# Patient Record
Sex: FEMALE | Race: WHITE | Employment: FULL TIME | URBAN - METROPOLITAN AREA
[De-identification: names, ages, dates, MRNs, and addresses within clinical notes are randomized per-mention and may not be internally consistent; named-entity substitution may affect disease eponyms.]

---

## 2017-01-23 RX ORDER — SUMATRIPTAN 100 MG/1
TABLET, FILM COATED ORAL
Qty: 9 TABLET | Refills: 3 | OUTPATIENT
Start: 2017-01-23

## 2017-01-24 ENCOUNTER — OFFICE VISIT (OUTPATIENT)
Dept: FAMILY MEDICINE CLINIC | Age: 52
End: 2017-01-24

## 2017-01-24 VITALS
HEART RATE: 72 BPM | DIASTOLIC BLOOD PRESSURE: 82 MMHG | TEMPERATURE: 97.4 F | RESPIRATION RATE: 12 BRPM | BODY MASS INDEX: 32.79 KG/M2 | WEIGHT: 178.2 LBS | HEIGHT: 62 IN | SYSTOLIC BLOOD PRESSURE: 134 MMHG

## 2017-01-24 DIAGNOSIS — G43.809 OTHER TYPE OF MIGRAINE: ICD-10-CM

## 2017-01-24 DIAGNOSIS — F41.9 ANXIETY: Primary | ICD-10-CM

## 2017-01-24 PROCEDURE — G8419 CALC BMI OUT NRM PARAM NOF/U: HCPCS | Performed by: FAMILY MEDICINE

## 2017-01-24 PROCEDURE — G8427 DOCREV CUR MEDS BY ELIG CLIN: HCPCS | Performed by: FAMILY MEDICINE

## 2017-01-24 PROCEDURE — 99213 OFFICE O/P EST LOW 20 MIN: CPT | Performed by: FAMILY MEDICINE

## 2017-01-24 PROCEDURE — G8484 FLU IMMUNIZE NO ADMIN: HCPCS | Performed by: FAMILY MEDICINE

## 2017-01-24 PROCEDURE — 1036F TOBACCO NON-USER: CPT | Performed by: FAMILY MEDICINE

## 2017-01-24 RX ORDER — SUMATRIPTAN 100 MG/1
TABLET, FILM COATED ORAL
Qty: 9 TABLET | Refills: 5 | Status: SHIPPED | OUTPATIENT
Start: 2017-01-24 | End: 2017-12-27 | Stop reason: SDUPTHER

## 2017-01-24 RX ORDER — ALPRAZOLAM 0.5 MG/1
TABLET ORAL
Qty: 30 TABLET | Refills: 2 | Status: SHIPPED | OUTPATIENT
Start: 2017-01-24 | End: 2017-12-27 | Stop reason: SDUPTHER

## 2017-02-14 RX ORDER — DESOGESTREL AND ETHINYL ESTRADIOL 0.15-0.03
KIT ORAL
Qty: 84 TABLET | Refills: 5 | Status: SHIPPED | OUTPATIENT
Start: 2017-02-14 | End: 2018-10-22 | Stop reason: ALTCHOICE

## 2017-08-28 ENCOUNTER — TELEPHONE (OUTPATIENT)
Dept: FAMILY MEDICINE CLINIC | Age: 52
End: 2017-08-28

## 2017-12-27 ENCOUNTER — OFFICE VISIT (OUTPATIENT)
Dept: FAMILY MEDICINE CLINIC | Age: 52
End: 2017-12-27

## 2017-12-27 VITALS
HEIGHT: 62 IN | TEMPERATURE: 98.9 F | BODY MASS INDEX: 34.6 KG/M2 | RESPIRATION RATE: 16 BRPM | WEIGHT: 188 LBS | DIASTOLIC BLOOD PRESSURE: 72 MMHG | HEART RATE: 97 BPM | SYSTOLIC BLOOD PRESSURE: 118 MMHG

## 2017-12-27 DIAGNOSIS — M25.562 LEFT KNEE PAIN, UNSPECIFIED CHRONICITY: Primary | ICD-10-CM

## 2017-12-27 DIAGNOSIS — R53.81 MALAISE AND FATIGUE: ICD-10-CM

## 2017-12-27 DIAGNOSIS — F41.9 ANXIETY: ICD-10-CM

## 2017-12-27 DIAGNOSIS — Z12.11 COLON CANCER SCREENING: ICD-10-CM

## 2017-12-27 DIAGNOSIS — Z12.31 VISIT FOR SCREENING MAMMOGRAM: ICD-10-CM

## 2017-12-27 DIAGNOSIS — R68.83 CHILLS: ICD-10-CM

## 2017-12-27 DIAGNOSIS — R53.83 MALAISE AND FATIGUE: ICD-10-CM

## 2017-12-27 LAB
INFLUENZA A ANTIBODY: NEGATIVE
INFLUENZA B ANTIBODY: NEGATIVE

## 2017-12-27 PROCEDURE — G8419 CALC BMI OUT NRM PARAM NOF/U: HCPCS | Performed by: FAMILY MEDICINE

## 2017-12-27 PROCEDURE — 3017F COLORECTAL CA SCREEN DOC REV: CPT | Performed by: FAMILY MEDICINE

## 2017-12-27 PROCEDURE — 1036F TOBACCO NON-USER: CPT | Performed by: FAMILY MEDICINE

## 2017-12-27 PROCEDURE — 87804 INFLUENZA ASSAY W/OPTIC: CPT | Performed by: FAMILY MEDICINE

## 2017-12-27 PROCEDURE — G8484 FLU IMMUNIZE NO ADMIN: HCPCS | Performed by: FAMILY MEDICINE

## 2017-12-27 PROCEDURE — G8427 DOCREV CUR MEDS BY ELIG CLIN: HCPCS | Performed by: FAMILY MEDICINE

## 2017-12-27 PROCEDURE — 20610 DRAIN/INJ JOINT/BURSA W/O US: CPT | Performed by: FAMILY MEDICINE

## 2017-12-27 PROCEDURE — 99213 OFFICE O/P EST LOW 20 MIN: CPT | Performed by: FAMILY MEDICINE

## 2017-12-27 RX ORDER — METHYLPREDNISOLONE ACETATE 80 MG/ML
80 INJECTION, SUSPENSION INTRA-ARTICULAR; INTRALESIONAL; INTRAMUSCULAR; SOFT TISSUE ONCE
Status: COMPLETED | OUTPATIENT
Start: 2017-12-27 | End: 2017-12-27

## 2017-12-27 RX ORDER — AZITHROMYCIN 250 MG/1
TABLET, FILM COATED ORAL
Qty: 1 PACKET | Refills: 0 | Status: SHIPPED | OUTPATIENT
Start: 2017-12-27 | End: 2018-01-06

## 2017-12-27 RX ORDER — SUMATRIPTAN 100 MG/1
TABLET, FILM COATED ORAL
Qty: 9 TABLET | Refills: 5 | Status: SHIPPED | OUTPATIENT
Start: 2017-12-27 | End: 2018-10-22 | Stop reason: SDUPTHER

## 2017-12-27 RX ORDER — ALPRAZOLAM 0.5 MG/1
TABLET ORAL
Qty: 30 TABLET | Refills: 2 | Status: SHIPPED | OUTPATIENT
Start: 2017-12-27 | End: 2018-07-08 | Stop reason: SDUPTHER

## 2017-12-27 RX ADMIN — METHYLPREDNISOLONE ACETATE 80 MG: 80 INJECTION, SUSPENSION INTRA-ARTICULAR; INTRALESIONAL; INTRAMUSCULAR; SOFT TISSUE at 14:17

## 2017-12-27 ASSESSMENT — ENCOUNTER SYMPTOMS
ABDOMINAL PAIN: 0
DIARRHEA: 0
EYE DISCHARGE: 0
CONSTIPATION: 0
COUGH: 0
SINUS PRESSURE: 0
SORE THROAT: 0
EYE ITCHING: 0
SHORTNESS OF BREATH: 0

## 2017-12-27 NOTE — PROGRESS NOTES
Allergies  Current Outpatient Prescriptions   Medication Sig Dispense Refill    APRI 0.15-30 MG-MCG per tablet TAKE 1 TABLET DAILY 84 tablet 5    ALPRAZolam (XANAX) 0.5 MG tablet TAKE 1 TABLET NIGHTLY AS NEEDED 30 tablet 2    SUMAtriptan (IMITREX) 100 MG tablet TAKE 1 TABLET BY MOUTH ONCE AS NEEDED 9 tablet 5    oxyCODONE-acetaminophen (PERCOCET) 5-325 MG per tablet Take 1 tablet by mouth every 6 hours as needed. Dr. Berenice Johnson Pain management specialist       Current Facility-Administered Medications   Medication Dose Route Frequency Provider Last Rate Last Dose    sodium hyaluronate (viscosup) injection 20 mg  20 mg Intra-articular Once Geena Crowe MD        sodium hyaluronate (viscosup) injection 20 mg  20 mg Intra-articular Once Geena Crowe MD        methylPREDNISolone acetate (DEPO-MEDROL) injection 80 mg  80 mg Intramuscular Once Geena Crowe MD         PMH, Surgical Hx, Family Hx, and Social Hx reviewed and updated. Health Maintenance reviewed. Objective    Vitals:    12/27/17 1340   Pulse: 97   Weight: 188 lb (85.3 kg)       Physical Exam   Constitutional: She is oriented to person, place, and time. She appears well-developed and well-nourished. HENT:   Head: Normocephalic. Mouth/Throat: Oropharynx is clear and moist.   Eyes: Pupils are equal, round, and reactive to light. Neck: Normal range of motion. Neck supple. No thyromegaly present. Cardiovascular: Normal rate and intact distal pulses. Exam reveals no gallop and no friction rub. No murmur heard. Pulmonary/Chest: Effort normal and breath sounds normal.   Abdominal: Soft. Bowel sounds are normal.   Musculoskeletal: Normal range of motion. Neurological: She is alert and oriented to person, place, and time. Skin: Skin is warm and dry. Psychiatric: She has a normal mood and affect. Her behavior is normal.   Patient has Max a sinus discomfort reveals postnasal drainage neck supple lungs clear left TM all. Nontender. Left knee injected as described also start her on some metabolic for the sinus infection flu test is negative and she'll let us know if she is not improving. Assessment & Plan   1. Left knee pain, unspecified chronicity  [address] - [place] DRAIN/INJECT LARGE JOINT/BURSA    methylPREDNISolone acetate (DEPO-MEDROL) injection 80 mg   2. Malaise and fatigue  POCT Influenza A/B   3. Chills  POCT Influenza A/B   4. Visit for screening mammogram  RAVI DIGITAL SCREEN W CAD BILATERAL   5. Colon cancer screening  Ambulatory referral to Gastroenterology   6. Anxiety  ALPRAZolam (XANAX) 0.5 MG tablet     Orders Placed This Encounter   Procedures    RAVI DIGITAL SCREEN W CAD BILATERAL     Standing Status:   Future     Standing Expiration Date:   12/27/2018     Order Specific Question:   Reason for exam:     Answer:   screening    Ambulatory referral to Gastroenterology     Referral Priority:   Routine     Referral Type:   Consult for Advice and Opinion     Referral Reason:   Specialty Services Required     Requested Specialty:   Gastroenterology     Number of Visits Requested:   1    POCT Influenza A/B    20610 - [place] DRAIN/INJECT LARGE JOINT/BURSA     Orders Placed This Encounter   Medications    methylPREDNISolone acetate (DEPO-MEDROL) injection 80 mg    ALPRAZolam (XANAX) 0.5 MG tablet     Sig: TAKE 1 TABLET NIGHTLY AS NEEDED. Dispense:  30 tablet     Refill:  2    azithromycin (ZITHROMAX Z-GALO) 250 MG tablet     Sig: Take 2 pills together on the first day. Take 1 pill a day for the remaining 4 days. Dispense:  1 packet     Refill:  0    SUMAtriptan (IMITREX) 100 MG tablet     Sig: TAKE 1 TABLET BY MOUTH ONCE AS NEEDED     Dispense:  9 tablet     Refill:  5     Medications Discontinued During This Encounter   Medication Reason    ALPRAZolam (XANAX) 0.5 MG tablet Reorder    SUMAtriptan (IMITREX) 100 MG tablet Reorder     No Follow-up on file.         Controlled Substances Monitoring:          Felipe Root MD            Knee Injection Procedure Note - Depo Medrol    Pre-operative Diagnosis: left Osteoarthritis    Post-operative Diagnosis: same    Indications: Osteoarthritic knee pain not responding to other therapy    Anesthesia: Lidocaine 1% without epinephrine     Procedure Details  Verbal consent was obtained for the procedure. The joint was prepped with Betadine and a small wheel of anesthetic was injected into the subcutaneous tissue. A 22 gauge needle was inserted into the anterior aspect of the joint inserting Depo Medrol. The needle was removed and the area cleansed and dressed. Complications:  None; patient tolerated the procedure well. Post - procedure instructions: If patient develops redness, swelling, pain, tenderness, fever, chills, bleeding, bruising, or drainage she will let us know immediately. Activity as tolerated. She may shower and bathe at home. Encounter Diagnoses   Name Primary?  Left knee pain, unspecified chronicity Yes    Malaise and fatigue     Chills     Visit for screening mammogram     Colon cancer screening          Medical Decision Making:  Orders Placed This Encounter   Procedures    POCT Influenza A/B      No orders of the defined types were placed in this encounter.           oarrs report ran 12/27/17

## 2018-01-02 ENCOUNTER — TELEPHONE (OUTPATIENT)
Dept: FAMILY MEDICINE CLINIC | Age: 53
End: 2018-01-02

## 2018-01-03 RX ORDER — CEFDINIR 300 MG/1
300 CAPSULE ORAL 2 TIMES DAILY
Qty: 20 CAPSULE | Refills: 0 | Status: SHIPPED | OUTPATIENT
Start: 2018-01-03 | End: 2018-10-22 | Stop reason: ALTCHOICE

## 2018-01-17 ENCOUNTER — HOSPITAL ENCOUNTER (OUTPATIENT)
Dept: WOMENS IMAGING | Age: 53
Discharge: HOME | End: 2018-01-17
Payer: COMMERCIAL

## 2018-01-17 DIAGNOSIS — Z12.31 VISIT FOR SCREENING MAMMOGRAM: ICD-10-CM

## 2018-01-17 PROCEDURE — 77067 SCR MAMMO BI INCL CAD: CPT

## 2018-02-05 ENCOUNTER — TELEPHONE (OUTPATIENT)
Dept: ENDOSCOPY | Age: 53
End: 2018-02-05

## 2018-03-16 ENCOUNTER — ANESTHESIA EVENT (OUTPATIENT)
Dept: ENDOSCOPY | Age: 53
End: 2018-03-16
Payer: COMMERCIAL

## 2018-03-16 ENCOUNTER — HOSPITAL ENCOUNTER (OUTPATIENT)
Age: 53
Setting detail: OUTPATIENT SURGERY
Discharge: HOME | End: 2018-03-16
Attending: SPECIALIST | Admitting: SPECIALIST
Payer: COMMERCIAL

## 2018-03-16 ENCOUNTER — ANESTHESIA (OUTPATIENT)
Dept: ENDOSCOPY | Age: 53
End: 2018-03-16
Payer: COMMERCIAL

## 2018-03-16 VITALS
WEIGHT: 175 LBS | DIASTOLIC BLOOD PRESSURE: 62 MMHG | OXYGEN SATURATION: 98 % | HEART RATE: 62 BPM | BODY MASS INDEX: 31.01 KG/M2 | TEMPERATURE: 98.1 F | HEIGHT: 63 IN | RESPIRATION RATE: 16 BRPM | SYSTOLIC BLOOD PRESSURE: 101 MMHG

## 2018-03-16 VITALS
SYSTOLIC BLOOD PRESSURE: 108 MMHG | DIASTOLIC BLOOD PRESSURE: 62 MMHG | RESPIRATION RATE: 15 BRPM | OXYGEN SATURATION: 98 %

## 2018-03-16 PROCEDURE — 7100000011 HC PHASE II RECOVERY - ADDTL 15 MIN: Performed by: SPECIALIST

## 2018-03-16 PROCEDURE — 88305 TISSUE EXAM BY PATHOLOGIST: CPT

## 2018-03-16 PROCEDURE — 3609027000 HC COLONOSCOPY: Performed by: SPECIALIST

## 2018-03-16 PROCEDURE — 3700000000 HC ANESTHESIA ATTENDED CARE: Performed by: SPECIALIST

## 2018-03-16 PROCEDURE — 3700000001 HC ADD 15 MINUTES (ANESTHESIA): Performed by: SPECIALIST

## 2018-03-16 PROCEDURE — 7100000010 HC PHASE II RECOVERY - FIRST 15 MIN: Performed by: SPECIALIST

## 2018-03-16 PROCEDURE — 6360000002 HC RX W HCPCS: Performed by: NURSE ANESTHETIST, CERTIFIED REGISTERED

## 2018-03-16 RX ORDER — LIDOCAINE HYDROCHLORIDE 10 MG/ML
1 INJECTION, SOLUTION EPIDURAL; INFILTRATION; INTRACAUDAL; PERINEURAL
Status: DISCONTINUED | OUTPATIENT
Start: 2018-03-16 | End: 2018-03-16 | Stop reason: HOSPADM

## 2018-03-16 RX ORDER — SODIUM CHLORIDE 0.9 % (FLUSH) 0.9 %
10 SYRINGE (ML) INJECTION EVERY 12 HOURS SCHEDULED
Status: DISCONTINUED | OUTPATIENT
Start: 2018-03-16 | End: 2018-03-16 | Stop reason: HOSPADM

## 2018-03-16 RX ORDER — SODIUM CHLORIDE 9 MG/ML
INJECTION, SOLUTION INTRAVENOUS CONTINUOUS
Status: DISCONTINUED | OUTPATIENT
Start: 2018-03-16 | End: 2018-03-16 | Stop reason: HOSPADM

## 2018-03-16 RX ORDER — SODIUM CHLORIDE 0.9 % (FLUSH) 0.9 %
10 SYRINGE (ML) INJECTION PRN
Status: DISCONTINUED | OUTPATIENT
Start: 2018-03-16 | End: 2018-03-16 | Stop reason: HOSPADM

## 2018-03-16 RX ORDER — ONDANSETRON 2 MG/ML
4 INJECTION INTRAMUSCULAR; INTRAVENOUS
Status: DISCONTINUED | OUTPATIENT
Start: 2018-03-16 | End: 2018-03-16 | Stop reason: HOSPADM

## 2018-03-16 RX ORDER — PROPOFOL 10 MG/ML
INJECTION, EMULSION INTRAVENOUS CONTINUOUS PRN
Status: DISCONTINUED | OUTPATIENT
Start: 2018-03-16 | End: 2018-03-16 | Stop reason: SDUPTHER

## 2018-03-16 RX ADMIN — PROPOFOL 100 MCG/KG/MIN: 10 INJECTION, EMULSION INTRAVENOUS at 07:21

## 2018-03-16 NOTE — ANESTHESIA PRE PROCEDURE
Department of Anesthesiology  Preprocedure Note       Name:  Sam Simon   Age:  50 y.o.  :  1965                                          MRN:  [id number]         Date:  3/16/2018      Surgeon: Arya Floyd):  Breezy Kahn MD    Procedure: Procedure(s):  COLONOSCOPY    Medications prior to admission:   Prior to Admission medications    Medication Sig Start Date End Date Taking? Authorizing Provider   cefdinir (OMNICEF) 300 MG capsule Take 1 capsule by mouth 2 times daily 1/3/18   Dashawn Pérez MD   ALPRAZolam Rhoderick Kubas) 0.5 MG tablet TAKE 1 TABLET NIGHTLY AS NEEDED. 17   Dashawn Pérez MD   SUMAtriptan (IMITREX) 100 MG tablet TAKE 1 TABLET BY MOUTH ONCE AS NEEDED 17   Dashawn Pérez MD   APRI 0.15-30 MG-MCG per tablet TAKE 1 TABLET DAILY 17   Dashawn Pérez MD   oxyCODONE-acetaminophen (PERCOCET) 5-325 MG per tablet Take 1 tablet by mouth every 6 hours as needed. Dr. Stanley Rangel Pain management specialist 2/15/13   Historical Provider, MD       Current medications:    No current facility-administered medications for this encounter. Allergies:  No Known Allergies    Problem List:    Patient Active Problem List   Diagnosis Code    Anxiety F41.9    Depression F32.9    Anemia D64.9    Metrorrhagia N92.1    Hot flashes R23.2       Past Medical History:        Diagnosis Date    Anemia     Anxiety     Depression     Hot flashes 2014    Metrorrhagia        Past Surgical History:  History reviewed. No pertinent surgical history. Social History:    Social History   Substance Use Topics    Smoking status: Former Smoker     Packs/day: 0.50     Years: 10.00     Types: Cigarettes     Start date: 1990     Quit date: 2000    Smokeless tobacco: Never Used    Alcohol use Yes                                Counseling given: Not Answered      Vital Signs (Current): There were no vitals filed for this visit.                                            BP Readings from Last 3 Encounters:   12/27/17 118/72   01/24/17 134/82   12/11/15 114/72       NPO Status:                                                                                 BMI:   Wt Readings from Last 3 Encounters:   12/27/17 188 lb (85.3 kg)   01/24/17 178 lb 3.2 oz (80.8 kg)   12/11/15 177 lb (80.3 kg)     There is no height or weight on file to calculate BMI.    CBC:   Lab Results   Component Value Date    WBC 4.4 03/12/2015    RBC 4.17 03/12/2015    HGB 13.1 03/12/2015    HCT 40.1 03/12/2015    MCV 96.0 03/12/2015    RDW 14.7 03/12/2015     03/12/2015       CMP:   Lab Results   Component Value Date     09/08/2014    K 4.5 09/08/2014     09/08/2014    CO2 23 09/08/2014    BUN 11 09/08/2014    CREATININE 0.64 09/08/2014    GFRAA >60.0 09/08/2014    LABGLOM >60.0 09/08/2014    GLUCOSE 98 09/08/2014    PROT 6.5 09/08/2014    CALCIUM 9.3 09/08/2014    BILITOT 0.1 09/08/2014    ALKPHOS 36 09/08/2014    AST 19 09/08/2014    ALT 14 09/08/2014       POC Tests: No results for input(s): POCGLU, POCNA, POCK, POCCL, POCBUN, POCHEMO, POCHCT in the last 72 hours. Coags:   Lab Results   Component Value Date    PROTIME 10.0 08/09/2012    INR 1.0 08/09/2012       HCG (If Applicable): No results found for: PREGTESTUR, PREGSERUM, HCG, HCGQUANT     ABGs: No results found for: PHART, PO2ART, TTJ3JLV, LWD7FDB, BEART, D5RVGINM     Type & Screen (If Applicable):  No results found for: LABABO, [address]    Anesthesia Evaluation  Patient summary reviewed and Nursing notes reviewed  Airway: Mallampati: II  TM distance: <3 FB   Neck ROM: full  Mouth opening: < 3 FB Dental:          Pulmonary:                              Cardiovascular:                      Neuro/Psych:   (+) psychiatric history:            GI/Hepatic/Renal:             Endo/Other:                     Abdominal:           Vascular:                                        Anesthesia Plan      MAC     ASA 2       Induction: intravenous.       Anesthetic plan

## 2018-05-16 ENCOUNTER — OFFICE VISIT (OUTPATIENT)
Dept: FAMILY MEDICINE CLINIC | Age: 53
End: 2018-05-16
Payer: COMMERCIAL

## 2018-05-16 VITALS
HEART RATE: 75 BPM | WEIGHT: 191 LBS | DIASTOLIC BLOOD PRESSURE: 78 MMHG | HEIGHT: 63 IN | BODY MASS INDEX: 33.84 KG/M2 | SYSTOLIC BLOOD PRESSURE: 118 MMHG | TEMPERATURE: 97.8 F | RESPIRATION RATE: 20 BRPM

## 2018-05-16 DIAGNOSIS — M25.562 LEFT KNEE PAIN, UNSPECIFIED CHRONICITY: Primary | ICD-10-CM

## 2018-05-16 PROCEDURE — 20610 DRAIN/INJ JOINT/BURSA W/O US: CPT | Performed by: FAMILY MEDICINE

## 2018-05-16 RX ORDER — METHYLPREDNISOLONE ACETATE 80 MG/ML
80 INJECTION, SUSPENSION INTRA-ARTICULAR; INTRALESIONAL; INTRAMUSCULAR; SOFT TISSUE ONCE
Status: COMPLETED | OUTPATIENT
Start: 2018-05-16 | End: 2018-05-16

## 2018-05-16 RX ADMIN — METHYLPREDNISOLONE ACETATE 80 MG: 80 INJECTION, SUSPENSION INTRA-ARTICULAR; INTRALESIONAL; INTRAMUSCULAR; SOFT TISSUE at 11:18

## 2018-05-16 ASSESSMENT — ENCOUNTER SYMPTOMS
DIARRHEA: 0
SHORTNESS OF BREATH: 0
SORE THROAT: 0
COUGH: 0
ABDOMINAL PAIN: 0
EYE DISCHARGE: 0
CONSTIPATION: 0
EYE ITCHING: 0
SINUS PRESSURE: 0

## 2018-05-16 ASSESSMENT — PATIENT HEALTH QUESTIONNAIRE - PHQ9
SUM OF ALL RESPONSES TO PHQ9 QUESTIONS 1 & 2: 0
SUM OF ALL RESPONSES TO PHQ QUESTIONS 1-9: 0
2. FEELING DOWN, DEPRESSED OR HOPELESS: 0
1. LITTLE INTEREST OR PLEASURE IN DOING THINGS: 0

## 2018-05-17 ENCOUNTER — TELEPHONE (OUTPATIENT)
Dept: FAMILY MEDICINE CLINIC | Age: 53
End: 2018-05-17

## 2018-06-15 ENCOUNTER — TELEPHONE (OUTPATIENT)
Dept: FAMILY MEDICINE CLINIC | Age: 53
End: 2018-06-15

## 2018-07-08 DIAGNOSIS — F41.9 ANXIETY: ICD-10-CM

## 2018-07-09 RX ORDER — ALPRAZOLAM 0.5 MG/1
TABLET ORAL
Qty: 30 TABLET | Refills: 0 | OUTPATIENT
Start: 2018-07-09 | End: 2018-08-08

## 2018-10-22 ENCOUNTER — OFFICE VISIT (OUTPATIENT)
Dept: FAMILY MEDICINE CLINIC | Age: 53
End: 2018-10-22
Payer: COMMERCIAL

## 2018-10-22 VITALS
TEMPERATURE: 97.1 F | SYSTOLIC BLOOD PRESSURE: 132 MMHG | WEIGHT: 188 LBS | BODY MASS INDEX: 33.31 KG/M2 | HEART RATE: 78 BPM | DIASTOLIC BLOOD PRESSURE: 82 MMHG | RESPIRATION RATE: 20 BRPM | HEIGHT: 63 IN

## 2018-10-22 DIAGNOSIS — J01.90 ACUTE BACTERIAL SINUSITIS: ICD-10-CM

## 2018-10-22 DIAGNOSIS — B96.89 ACUTE BACTERIAL SINUSITIS: ICD-10-CM

## 2018-10-22 DIAGNOSIS — F41.9 ANXIETY: ICD-10-CM

## 2018-10-22 DIAGNOSIS — Z78.0 POSTMENOPAUSE: ICD-10-CM

## 2018-10-22 DIAGNOSIS — J34.89 SINUS PRESSURE: Primary | ICD-10-CM

## 2018-10-22 PROCEDURE — 3017F COLORECTAL CA SCREEN DOC REV: CPT | Performed by: FAMILY MEDICINE

## 2018-10-22 PROCEDURE — G8484 FLU IMMUNIZE NO ADMIN: HCPCS | Performed by: FAMILY MEDICINE

## 2018-10-22 PROCEDURE — G8427 DOCREV CUR MEDS BY ELIG CLIN: HCPCS | Performed by: FAMILY MEDICINE

## 2018-10-22 PROCEDURE — 1036F TOBACCO NON-USER: CPT | Performed by: FAMILY MEDICINE

## 2018-10-22 PROCEDURE — 99213 OFFICE O/P EST LOW 20 MIN: CPT | Performed by: FAMILY MEDICINE

## 2018-10-22 PROCEDURE — G8417 CALC BMI ABV UP PARAM F/U: HCPCS | Performed by: FAMILY MEDICINE

## 2018-10-22 RX ORDER — CEFDINIR 300 MG/1
300 CAPSULE ORAL 2 TIMES DAILY
Qty: 20 CAPSULE | Refills: 0 | Status: SHIPPED | OUTPATIENT
Start: 2018-10-22 | End: 2019-02-18 | Stop reason: ALTCHOICE

## 2018-10-22 RX ORDER — ALPRAZOLAM 0.5 MG/1
TABLET ORAL
Qty: 30 TABLET | Refills: 2 | Status: SHIPPED | OUTPATIENT
Start: 2018-10-22 | End: 2018-11-22

## 2018-10-22 RX ORDER — SUMATRIPTAN 100 MG/1
TABLET, FILM COATED ORAL
Qty: 9 TABLET | Refills: 5 | Status: SHIPPED | OUTPATIENT
Start: 2018-10-22 | End: 2019-02-18 | Stop reason: SDUPTHER

## 2018-10-22 RX ORDER — METHYLPREDNISOLONE 4 MG/1
TABLET ORAL
Qty: 1 KIT | Refills: 0 | Status: SHIPPED | OUTPATIENT
Start: 2018-10-22 | End: 2019-02-18 | Stop reason: ALTCHOICE

## 2018-10-22 ASSESSMENT — ENCOUNTER SYMPTOMS
SHORTNESS OF BREATH: 0
DIARRHEA: 0
COUGH: 1
SORE THROAT: 0
CONSTIPATION: 0
EYE DISCHARGE: 0
SINUS PRESSURE: 1
EYE ITCHING: 0
ABDOMINAL PAIN: 0

## 2019-02-18 ENCOUNTER — OFFICE VISIT (OUTPATIENT)
Dept: FAMILY MEDICINE CLINIC | Age: 54
End: 2019-02-18
Payer: COMMERCIAL

## 2019-02-18 VITALS
DIASTOLIC BLOOD PRESSURE: 72 MMHG | TEMPERATURE: 98 F | SYSTOLIC BLOOD PRESSURE: 118 MMHG | RESPIRATION RATE: 20 BRPM | WEIGHT: 185 LBS | HEIGHT: 63 IN | BODY MASS INDEX: 32.78 KG/M2

## 2019-02-18 DIAGNOSIS — R82.90 ABNORMAL URINE ODOR: ICD-10-CM

## 2019-02-18 DIAGNOSIS — J34.89 SINUS PRESSURE: Primary | ICD-10-CM

## 2019-02-18 DIAGNOSIS — R35.0 URINARY FREQUENCY: ICD-10-CM

## 2019-02-18 DIAGNOSIS — Z12.31 SCREENING MAMMOGRAM, ENCOUNTER FOR: ICD-10-CM

## 2019-02-18 LAB
BILIRUBIN, POC: NORMAL
BLOOD URINE, POC: 25
CLARITY, POC: NORMAL
COLOR, POC: NORMAL
GLUCOSE URINE, POC: NORMAL
KETONES, POC: 4
LEUKOCYTE EST, POC: NORMAL
NITRITE, POC: 70
PH, POC: 5.5
PROTEIN, POC: NORMAL
SPECIFIC GRAVITY, POC: 1.02
UROBILINOGEN, POC: 3.5

## 2019-02-18 PROCEDURE — G8484 FLU IMMUNIZE NO ADMIN: HCPCS | Performed by: FAMILY MEDICINE

## 2019-02-18 PROCEDURE — 99213 OFFICE O/P EST LOW 20 MIN: CPT | Performed by: FAMILY MEDICINE

## 2019-02-18 PROCEDURE — 81002 URINALYSIS NONAUTO W/O SCOPE: CPT | Performed by: FAMILY MEDICINE

## 2019-02-18 PROCEDURE — G8427 DOCREV CUR MEDS BY ELIG CLIN: HCPCS | Performed by: FAMILY MEDICINE

## 2019-02-18 PROCEDURE — 1036F TOBACCO NON-USER: CPT | Performed by: FAMILY MEDICINE

## 2019-02-18 PROCEDURE — G8417 CALC BMI ABV UP PARAM F/U: HCPCS | Performed by: FAMILY MEDICINE

## 2019-02-18 PROCEDURE — 3017F COLORECTAL CA SCREEN DOC REV: CPT | Performed by: FAMILY MEDICINE

## 2019-02-18 RX ORDER — CIPROFLOXACIN 500 MG/1
500 TABLET, FILM COATED ORAL 2 TIMES DAILY
Qty: 20 TABLET | Refills: 0 | Status: SHIPPED | OUTPATIENT
Start: 2019-02-18 | End: 2019-02-28

## 2019-02-18 RX ORDER — GABAPENTIN 100 MG/1
100 CAPSULE ORAL DAILY
COMMUNITY
Start: 2019-02-17

## 2019-02-18 RX ORDER — CELECOXIB 200 MG/1
200 CAPSULE ORAL DAILY
Refills: 2 | COMMUNITY
Start: 2018-12-14

## 2019-02-18 RX ORDER — SUMATRIPTAN 100 MG/1
TABLET, FILM COATED ORAL
Qty: 9 TABLET | Refills: 11 | Status: SHIPPED | OUTPATIENT
Start: 2019-02-18

## 2019-02-18 RX ORDER — ALPRAZOLAM 0.5 MG/1
0.5 TABLET ORAL NIGHTLY PRN
Refills: 2 | COMMUNITY
Start: 2019-01-21

## 2019-02-18 ASSESSMENT — ENCOUNTER SYMPTOMS
SORE THROAT: 1
DIARRHEA: 0
EYE ITCHING: 0
SHORTNESS OF BREATH: 0
ABDOMINAL PAIN: 0
EYE DISCHARGE: 0
COUGH: 0
SINUS PRESSURE: 1
CONSTIPATION: 0

## 2019-02-20 LAB — URINE CULTURE, ROUTINE: NORMAL

## 2019-03-05 ENCOUNTER — HOSPITAL ENCOUNTER (OUTPATIENT)
Dept: WOMENS IMAGING | Age: 54
Discharge: HOME | End: 2019-03-07
Payer: COMMERCIAL

## 2019-03-05 DIAGNOSIS — Z12.31 SCREENING MAMMOGRAM, ENCOUNTER FOR: ICD-10-CM

## 2019-03-05 PROCEDURE — 77067 SCR MAMMO BI INCL CAD: CPT

## 2019-03-06 ENCOUNTER — TELEPHONE (OUTPATIENT)
Dept: FAMILY MEDICINE CLINIC | Age: 54
End: 2019-03-06

## 2019-03-06 ENCOUNTER — TELEPHONE (OUTPATIENT)
Dept: ADMINISTRATIVE | Age: 54
End: 2019-03-06

## 2019-03-27 ENCOUNTER — OFFICE VISIT (OUTPATIENT)
Dept: FAMILY MEDICINE CLINIC | Age: 54
End: 2019-03-27
Payer: COMMERCIAL

## 2019-03-27 VITALS
HEIGHT: 63 IN | WEIGHT: 190 LBS | RESPIRATION RATE: 20 BRPM | HEART RATE: 82 BPM | TEMPERATURE: 98 F | SYSTOLIC BLOOD PRESSURE: 128 MMHG | BODY MASS INDEX: 33.66 KG/M2 | DIASTOLIC BLOOD PRESSURE: 82 MMHG

## 2019-03-27 DIAGNOSIS — G89.29 CHRONIC PAIN OF LEFT KNEE: ICD-10-CM

## 2019-03-27 DIAGNOSIS — R20.0 NUMBNESS: Primary | ICD-10-CM

## 2019-03-27 DIAGNOSIS — M25.562 CHRONIC PAIN OF LEFT KNEE: ICD-10-CM

## 2019-03-27 PROCEDURE — 1036F TOBACCO NON-USER: CPT | Performed by: FAMILY MEDICINE

## 2019-03-27 PROCEDURE — 99214 OFFICE O/P EST MOD 30 MIN: CPT | Performed by: FAMILY MEDICINE

## 2019-03-27 PROCEDURE — G8484 FLU IMMUNIZE NO ADMIN: HCPCS | Performed by: FAMILY MEDICINE

## 2019-03-27 PROCEDURE — 3017F COLORECTAL CA SCREEN DOC REV: CPT | Performed by: FAMILY MEDICINE

## 2019-03-27 PROCEDURE — G8427 DOCREV CUR MEDS BY ELIG CLIN: HCPCS | Performed by: FAMILY MEDICINE

## 2019-03-27 PROCEDURE — G8417 CALC BMI ABV UP PARAM F/U: HCPCS | Performed by: FAMILY MEDICINE

## 2019-03-27 RX ORDER — PREDNISONE 10 MG/1
TABLET ORAL
Qty: 51 TABLET | Refills: 0 | Status: SHIPPED | OUTPATIENT
Start: 2019-03-27 | End: 2019-04-06

## 2019-03-27 ASSESSMENT — PATIENT HEALTH QUESTIONNAIRE - PHQ9
SUM OF ALL RESPONSES TO PHQ QUESTIONS 1-9: 0
SUM OF ALL RESPONSES TO PHQ QUESTIONS 1-9: 0
1. LITTLE INTEREST OR PLEASURE IN DOING THINGS: 0
2. FEELING DOWN, DEPRESSED OR HOPELESS: 0
SUM OF ALL RESPONSES TO PHQ9 QUESTIONS 1 & 2: 0

## 2019-03-27 ASSESSMENT — ENCOUNTER SYMPTOMS
SHORTNESS OF BREATH: 0
DIARRHEA: 0
CONSTIPATION: 0
EYE DISCHARGE: 0
COUGH: 0
EYE ITCHING: 0
ABDOMINAL PAIN: 0
SINUS PRESSURE: 0
SORE THROAT: 0

## 2022-10-07 ENCOUNTER — OFFICE VISIT (OUTPATIENT)
Dept: PAIN MANAGEMENT | Age: 57
End: 2022-10-07
Payer: COMMERCIAL

## 2022-10-07 DIAGNOSIS — R20.0 BILATERAL HAND NUMBNESS: Primary | ICD-10-CM

## 2022-10-07 PROCEDURE — 95911 NRV CNDJ TEST 9-10 STUDIES: CPT | Performed by: PHYSICAL MEDICINE & REHABILITATION

## 2022-10-07 PROCEDURE — 95886 MUSC TEST DONE W/N TEST COMP: CPT | Performed by: PHYSICAL MEDICINE & REHABILITATION

## 2022-10-07 NOTE — PROGRESS NOTES
Electromyography (EMG)/Nerve conduction studies (NCS) Report: Upper Extremities    Name: Analilia Canseco   : 1965  Date: 10/7/2022   Physician: Hermilo Owens MD        INDICATIONS: Analilia Canseco is a 56 y.o. female who presents for electrodiagnostic evaluation for bilateral carpal tunnel syndrome by request of Dr. Marie Kyle. Her main symptom for evaluation is bilateral hand numbness. She is right-handed. Both limbs are necessary to examine in order to evaluate for any evidence of systemic disease as well as establish normal baseline values from which to compare any abnormal unilateral findings. The study is explained and verbal consent to proceed is obtained. NERVE CONDUCTION STUDIES:  Sensory nerve conduction studies: Bilateral median sensory nerve conduction studies to the second digit demonstrate borderline-prolonged peak latencies and borderline-diminished amplitudes. Bilateral ulnar sensory nerve conduction studies to the fifth digit demonstrate normal peak latencies and amplitudes. Bilateral radial sensory nerve conduction studies to the base of the thumb demonstrate normal peak latencies and amplitudes. Bilateral upper limb temperatures are normal.     Motor nerve conduction studies: Bilateral median motor nerve conduction studies with pickup over the abductor pollicis brevis demonstrate normal distal latencies and amplitudes. Bilateral ulnar motor nerve conduction studies with pickup over the abductor digiti minimi normal distal latencies and amplitudes. ELECTROMYOGRAPHY: A disposable monopolar needle is used to evaluate bilateral deltoid, biceps, triceps, brachioradialis, extensor indicis proprius, first dorsal interosseous, and opponens pollicis. All of the muscles sampled are free of any increased insertional activity or any abnormal spontaneous activity. Motor unit recruitment is unremarkable.  Bilateral mid cervical paraspinal muscle sampling is free of any increased insertional activity or any abnormal spontaneous activity. SUMMARY:  This study is borderline-abnormal:  1. There is limited electrodiagnostic evidence for a bilateral median mononeuropathy at the wrist consistent with a clinical diagnosis of Bilateral carpal tunnel syndrome as clinically questioned. This is very mild in degree by electrical criteria bilaterally. Sensory studies demonstrate borderline-abnormal changes. There is no active denervation on electromyography bilaterally . 2. There is no current evidence for an active bilateral cervical motor radiculopathy or generalized large fiber sensorimotor peripheral polyneuropathy. RECOMMENDATIONS: The patient should follow up with Dr. Destin Whitman as previously instructed. If her symptoms persist or worsen, further electrodiagnostic evaluation may be considered if the patient is agreeable. Clinical correlation is recommended.

## 2022-11-29 ENCOUNTER — ANESTHESIA EVENT (OUTPATIENT)
Dept: OPERATING ROOM | Age: 57
End: 2022-11-29
Payer: COMMERCIAL

## 2022-11-30 ENCOUNTER — HOSPITAL ENCOUNTER (OUTPATIENT)
Age: 57
Setting detail: OUTPATIENT SURGERY
Discharge: HOME | End: 2022-11-30
Attending: ORTHOPAEDIC SURGERY | Admitting: ORTHOPAEDIC SURGERY
Payer: COMMERCIAL

## 2022-11-30 ENCOUNTER — ANESTHESIA (OUTPATIENT)
Dept: OPERATING ROOM | Age: 57
End: 2022-11-30
Payer: COMMERCIAL

## 2022-11-30 VITALS
WEIGHT: 190 LBS | SYSTOLIC BLOOD PRESSURE: 135 MMHG | DIASTOLIC BLOOD PRESSURE: 72 MMHG | HEIGHT: 63 IN | RESPIRATION RATE: 16 BRPM | BODY MASS INDEX: 33.66 KG/M2 | TEMPERATURE: 96.9 F | OXYGEN SATURATION: 99 % | HEART RATE: 63 BPM

## 2022-11-30 PROCEDURE — 3600000012 HC SURGERY LEVEL 2 ADDTL 15MIN: Performed by: ORTHOPAEDIC SURGERY

## 2022-11-30 PROCEDURE — 3700000000 HC ANESTHESIA ATTENDED CARE: Performed by: ORTHOPAEDIC SURGERY

## 2022-11-30 PROCEDURE — 2500000003 HC RX 250 WO HCPCS: Performed by: ORTHOPAEDIC SURGERY

## 2022-11-30 PROCEDURE — A4217 STERILE WATER/SALINE, 500 ML: HCPCS | Performed by: ORTHOPAEDIC SURGERY

## 2022-11-30 PROCEDURE — 3700000001 HC ADD 15 MINUTES (ANESTHESIA): Performed by: ORTHOPAEDIC SURGERY

## 2022-11-30 PROCEDURE — 3600000002 HC SURGERY LEVEL 2 BASE: Performed by: ORTHOPAEDIC SURGERY

## 2022-11-30 PROCEDURE — 7100000010 HC PHASE II RECOVERY - FIRST 15 MIN: Performed by: ORTHOPAEDIC SURGERY

## 2022-11-30 PROCEDURE — 7100000011 HC PHASE II RECOVERY - ADDTL 15 MIN: Performed by: ORTHOPAEDIC SURGERY

## 2022-11-30 PROCEDURE — 2580000003 HC RX 258: Performed by: ORTHOPAEDIC SURGERY

## 2022-11-30 PROCEDURE — 2580000003 HC RX 258: Performed by: ANESTHESIOLOGY

## 2022-11-30 PROCEDURE — 2709999900 HC NON-CHARGEABLE SUPPLY: Performed by: ORTHOPAEDIC SURGERY

## 2022-11-30 RX ORDER — MAGNESIUM HYDROXIDE 1200 MG/15ML
LIQUID ORAL CONTINUOUS PRN
Status: COMPLETED | OUTPATIENT
Start: 2022-11-30 | End: 2022-11-30

## 2022-11-30 RX ORDER — ONDANSETRON 2 MG/ML
4 INJECTION INTRAMUSCULAR; INTRAVENOUS
Status: CANCELLED | OUTPATIENT
Start: 2022-11-30 | End: 2022-12-01

## 2022-11-30 RX ORDER — DEXTROSE MONOHYDRATE 100 MG/ML
INJECTION, SOLUTION INTRAVENOUS CONTINUOUS PRN
Status: CANCELLED | OUTPATIENT
Start: 2022-11-30

## 2022-11-30 RX ORDER — SODIUM CHLORIDE, SODIUM LACTATE, POTASSIUM CHLORIDE, CALCIUM CHLORIDE 600; 310; 30; 20 MG/100ML; MG/100ML; MG/100ML; MG/100ML
INJECTION, SOLUTION INTRAVENOUS CONTINUOUS
Status: DISCONTINUED | OUTPATIENT
Start: 2022-11-30 | End: 2022-11-30 | Stop reason: HOSPADM

## 2022-11-30 RX ORDER — LABETALOL HYDROCHLORIDE 5 MG/ML
10 INJECTION, SOLUTION INTRAVENOUS
Status: CANCELLED | OUTPATIENT
Start: 2022-11-30

## 2022-11-30 RX ORDER — LIDOCAINE HYDROCHLORIDE AND EPINEPHRINE 20; 5 MG/ML; UG/ML
20 INJECTION, SOLUTION EPIDURAL; INFILTRATION; INTRACAUDAL; PERINEURAL ONCE
Status: DISCONTINUED | OUTPATIENT
Start: 2022-11-30 | End: 2022-11-30

## 2022-11-30 RX ORDER — SODIUM CHLORIDE 0.9 % (FLUSH) 0.9 %
5-40 SYRINGE (ML) INJECTION PRN
Status: CANCELLED | OUTPATIENT
Start: 2022-11-30

## 2022-11-30 RX ORDER — HYDRALAZINE HYDROCHLORIDE 20 MG/ML
10 INJECTION INTRAMUSCULAR; INTRAVENOUS
Status: CANCELLED | OUTPATIENT
Start: 2022-11-30

## 2022-11-30 RX ORDER — IPRATROPIUM BROMIDE AND ALBUTEROL SULFATE 2.5; .5 MG/3ML; MG/3ML
1 SOLUTION RESPIRATORY (INHALATION)
Status: CANCELLED | OUTPATIENT
Start: 2022-11-30 | End: 2022-12-01

## 2022-11-30 RX ORDER — OXYCODONE HYDROCHLORIDE 5 MG/1
5 TABLET ORAL
Status: CANCELLED | OUTPATIENT
Start: 2022-11-30 | End: 2022-12-01

## 2022-11-30 RX ORDER — SODIUM CHLORIDE 0.9 % (FLUSH) 0.9 %
5-40 SYRINGE (ML) INJECTION EVERY 12 HOURS SCHEDULED
Status: CANCELLED | OUTPATIENT
Start: 2022-11-30

## 2022-11-30 RX ORDER — LIDOCAINE HYDROCHLORIDE AND EPINEPHRINE 10; 10 MG/ML; UG/ML
20 INJECTION, SOLUTION INFILTRATION; PERINEURAL ONCE
Status: COMPLETED | OUTPATIENT
Start: 2022-11-30 | End: 2022-11-30

## 2022-11-30 RX ORDER — SODIUM CHLORIDE 9 MG/ML
25 INJECTION, SOLUTION INTRAVENOUS PRN
Status: CANCELLED | OUTPATIENT
Start: 2022-11-30

## 2022-11-30 RX ORDER — METOCLOPRAMIDE HYDROCHLORIDE 5 MG/ML
10 INJECTION INTRAMUSCULAR; INTRAVENOUS
Status: CANCELLED | OUTPATIENT
Start: 2022-11-30 | End: 2022-12-01

## 2022-11-30 RX ORDER — LIDOCAINE HYDROCHLORIDE 10 MG/ML
2 INJECTION, SOLUTION EPIDURAL; INFILTRATION; INTRACAUDAL; PERINEURAL ONCE
Status: DISCONTINUED | OUTPATIENT
Start: 2022-11-30 | End: 2022-11-30 | Stop reason: HOSPADM

## 2022-11-30 RX ADMIN — LIDOCAINE HYDROCHLORIDE,EPINEPHRINE BITARTRATE 20 ML: 10; .01 INJECTION, SOLUTION INFILTRATION; PERINEURAL at 08:21

## 2022-11-30 RX ADMIN — SODIUM CHLORIDE, POTASSIUM CHLORIDE, SODIUM LACTATE AND CALCIUM CHLORIDE 1000 ML: 600; 310; 30; 20 INJECTION, SOLUTION INTRAVENOUS at 08:17

## 2022-11-30 RX ADMIN — SODIUM BICARBONATE 2 ML: 84 INJECTION, SOLUTION INTRAVENOUS at 08:20

## 2022-11-30 ASSESSMENT — PAIN - FUNCTIONAL ASSESSMENT: PAIN_FUNCTIONAL_ASSESSMENT: NONE - DENIES PAIN

## 2022-11-30 ASSESSMENT — PAIN SCALES - GENERAL: PAINLEVEL_OUTOF10: 0

## 2022-11-30 NOTE — OP NOTE
Operative Note      Patient: Levie Crigler  YOB: 1965  MRN: [id number]    Date of Procedure: 11/30/2022      Preoperative diagnosis: Left carpal tunnel syndrome     Postoperative diagnosis: Left carpal tunnel syndrome     Procedure planned: Left carpal tunnel release     Procedure performed: Left carpal tunnel release     Surgeon: Cami Crandall D.O. Assistant: SELVIN Ponce  The physician assistant was present through the entire case. Given the nature of the disease process and the procedure to be performed a skilled surgical assistant was necessary during the case. The assistant was necessary in order to hold retractors and directly assist in the operation. A certified scrub tech was at the back table managing instruments and supplies for the surgical case. Anesthesia: Local field block using lidocaine with epinephrine solution and monitored by the anesthesia team     Estimated blood loss: Less than 10 mL     Drains: None     Tourniquet: None     Specimens: None     Implants: None     Indications: The patient presented to the office with subjective symptoms, physical examination, and EMG/ nerve conduction study test consistent with left carpal tunnel syndrome. The patient had failure of nonoperative treatment strategies. After full discussion regarding risks benefits and alternatives the patient elected to forego any additional nonoperative management in favor of left carpal tunnel release. Informed consent was signed and placed in the chart. Complications: None noted at the time of surgery     Description of operation: The patient was taken to the operative suite and placed in the supine position on the operating table. A timeout was performed and the left carpal tunnel was confirmed to be the operative site. The patient was carefully positioned on the table in such a fashion as to pad all bony prominences and peripheral nerves.  The patient was administered appropriate preoperative IV antibiotics. The patient was then prepped and draped in the normal sterile fashion. After prepping and draping a longitudinal incision was marked out directly overlying the transverse carpal ligament in line with the ring finger ray. Forceps were used to gently grasp and pinch the skin in the zone of intended surgical dissection to check for adequate anesthesia. After confirming adequate anesthesia the 15 blade was used to incise skin and dissect down to the subcutaneous plane. The palmar aponeurosis was divided in line with the incision to expose the transverse carpal ligament. The transverse carpal ligament was then meticulously divided under direct visualization, working from distal to proximal, taking care to avoid iatrogenic injury to the underlying contents of the carpal tunnel. The tenotomy scissors were used to release the most proximal fibers of the transverse carpal ligament along with the most distal fibers of the antebrachial fascia. This was done under direct visualization. The distal release of the transverse carpal ligament was carried to the level of the fat change. Direct inspection and digital palpation confirmed complete release of the carpal tunnel. Copious irrigation was performed. Interrupted nylon stitches were used to close the skin. A bulky nonadherent dressing was placed. The patient was then allowed to head to recovery in stable condition. Overall the patient tolerated the procedure well.      Disposition: Stable to PACU              Electronically signed by Jesus Morley DO on 11/30/2022 at 9:39 AM

## 2022-11-30 NOTE — PROGRESS NOTES
DC instr rev'd with pt and she states understanding. POST-CRYOTHERAPY INSTRUCTIONS  (AKA LIQUID NITROGEN TREATMENT)    Your skin has been treated with liquid nitrogen, also known as cryotherapy. This creates a superficial burn, like frostbite to the skin, which should destroy the underlying lesion. You can expect the area to be pink and inflamed for about 48 hours. Around day 3-4 after treatment the treated site will darken, forms a scab or blister, and then slough away. KEEP VASELINE PETROLEUM JELLY OR AQUAPHOR on the site at all times during this phase, which speeds the healing process. Use gentle soap and water to cleanse the skin. Do not pick at any scabs. Treated skin usually heals within 1-3 weeks. It may be pink for up to 3 months, and excess pigment is often removed, so the treated areas may be lighter in color than the surrounding skin. Post Biopsy Wound Care Instructions  â¢ After your biopsy, minimize activity of the affected area for the remainder of the day if possible. Minimize straining, strenuous activity, bending, and lifting. â¢ If you have discomfort, use Tylenol every 4-6 hours. Do not take aspirin, ibuprofen, or ibuprofen-like products for pain relief, they may cause increased bleeding. Minor swelling after a biopsy is expected. Use ice-packs 2-3 times a day for 10-15 minutes if it occurs. â¢ Keep the bandage applied in the Dermatology Clinic in place for 24 hours. The surgical site needs to be kept completely DRY during this time to optimize healing. â¢ It is okay for water and/or soap and/or shampoo to run over the wound. Dry carefully and re-apply a bandage as below. â¢ After cleaning wound with soap and water, re-apply a thin layer of petroleum jelly, Vaseline or Aquaphor to the biopsy site  â¢ If BLEEDING occurs, apply continuous gentle pressure with a clean, dry washcloth or gauze for 15 minutes. DO NOT check the site during these 15 minutes. IF there is still bleeding after that time repeat this step.   If bleeding continues, call our office immediately. â¢ Some redness and swelling is expected. This does not require treatment and will resolve in 1-2 weeks. If the area should become very SORE or RED, if there is DRAINAGE, or if you have a FEVER, call our office immediately.   â¢ If you have bleeding that will not stop, or are worried about infection, please call the office: [phone]

## 2022-11-30 NOTE — ANESTHESIA POSTPROCEDURE EVALUATION
Department of Anesthesiology  Postprocedure Note    Patient: Luis Enrique Nurse  MRN: [id number]  YOB: 1965  Date of evaluation: 11/30/2022      Procedure Summary     Date: 11/30/22 Room / Location: [address]    Anesthesia Start: 5521 Anesthesia Stop: 0929    Procedure: LEFT WRIST CARPAL TUNNEL RELEASE (Left: Wrist) Diagnosis:       Carpal tunnel syndrome on left      (LEFT WRIST CARPAL TUNNEL SYNDROME)    Surgeons: Angel England DO Responsible Provider: Keely Benson MD    Anesthesia Type: MAC ASA Status: 2          Anesthesia Type: MAC    Dominguez Phase I:      Dominguez Phase II: Dominguez Score: 10      Anesthesia Post Evaluation    Patient location during evaluation: bedside  Patient participation: complete - patient participated  Level of consciousness: awake and awake and alert  Airway patency: patent  Nausea & Vomiting: no nausea and no vomiting  Complications: no  Cardiovascular status: blood pressure returned to baseline and hemodynamically stable  Respiratory status: acceptable  Hydration status: euvolemic

## 2022-11-30 NOTE — ANESTHESIA PRE PROCEDURE
N92.1    Hot flashes R23.2    Internal derangement of knee M23.90    Localized osteoarthrosis, lower leg M17.10       Past Medical History:        Diagnosis Date    Anemia     Anxiety     Arthritis     Depression     Hot flashes 2014    Metrorrhagia        Past Surgical History:        Procedure Laterality Date    BACK SURGERY      [place] COLON CA [place]CR[place] NOT  [address] IND N/A 3/16/2018    COLONOSCOPY performed by Chanel Agosto MD at [facility] History:    Social History     Tobacco Use    Smoking status: Former     Packs/day: 0.50     Years: 10.00     Pack years: 5.00     Types: Cigarettes     Start date: 1990     Quit date: 2000     Years since quittin.9    Smokeless tobacco: Never   Substance Use Topics    Alcohol use: Yes     Comment: weekly                                Counseling given: Not Answered      Vital Signs (Current): There were no vitals filed for this visit.                                            BP Readings from Last 3 Encounters:   19 128/82   19 118/72   10/22/18 132/82       NPO Status:                                                                                 BMI:   Wt Readings from Last 3 Encounters:   19 190 lb (86.2 kg)   19 185 lb (83.9 kg)   10/22/18 188 lb (85.3 kg)     There is no height or weight on file to calculate BMI.    CBC:   Lab Results   Component Value Date/Time    WBC 4.4 2015 02:30 PM    RBC 4.17 2015 02:30 PM    HGB 13.1 2015 02:30 PM    HCT 40.1 2015 02:30 PM    MCV 96.0 2015 02:30 PM    RDW 14.7 2015 02:30 PM     2015 02:30 PM       CMP:   Lab Results   Component Value Date/Time     2014 08:59 PM    K 4.5 2014 08:59 PM     2014 08:59 PM    CO2 23 2014 08:59 PM    BUN 11 2014 08:59 PM    CREATININE 0.64 2014 08:59 PM    GFRAA >60.0 2014 08:59 PM    LABGLOM >60.0 2014 08:59 PM    GLUCOSE 98 09/08/2014 08:59 PM    PROT 6.5 09/08/2014 08:59 PM    CALCIUM 9.3 09/08/2014 08:59 PM    BILITOT 0.1 09/08/2014 08:59 PM    ALKPHOS 36 09/08/2014 08:59 PM    AST 19 09/08/2014 08:59 PM    ALT 14 09/08/2014 08:59 PM       POC Tests: No results for input(s): POCGLU, POCNA, POCK, POCCL, POCBUN, POCHEMO, POCHCT in the last 72 hours. Coags:   Lab Results   Component Value Date/Time    PROTIME 10.0 08/09/2012 09:42 AM    INR 1.0 08/09/2012 09:42 AM       HCG (If Applicable): No results found for: PREGTESTUR, PREGSERUM, HCG, HCGQUANT     ABGs: No results found for: PHART, PO2ART, YKR9OCA, BGB4AVX, BEART, H2JFJZBY     Type & Screen (If Applicable):  No results found for: LABABO, [address]    Anesthesia Evaluation  Patient summary reviewed and Nursing notes reviewed no history of anesthetic complications:   Airway: Mallampati: II  TM distance: <3 FB   Neck ROM: full  Mouth opening: < 3 FB   Dental:          Pulmonary:Negative Pulmonary ROS                              Cardiovascular:Negative CV ROS  Exercise tolerance: good (>4 METS),            Beta Blocker:  Not on Beta Blocker         Neuro/Psych:   Negative Neuro/Psych ROS  (+) psychiatric history:            GI/Hepatic/Renal: Neg GI/Hepatic/Renal ROS            Endo/Other: Negative Endo/Other ROS             Pt had PAT visit. Abdominal:             Vascular: negative vascular ROS. Other Findings:             Anesthesia Plan      MAC     ASA 2       Induction: intravenous. Anesthetic plan and risks discussed with patient.       Plan discussed with surgical team.    Attending anesthesiologist reviewed and agrees with Preprocedure content                Flora Rodriguez MD   11/30/2022

## 2022-11-30 NOTE — ANESTHESIA PRE PROCEDURE
Department of Anesthesiology  Preprocedure Note       Name:  Shawna Lees   Age:  56 y.o.  :  1965                                          MRN:  [id number]         Date:  2022      Surgeon: Charles Raphael):  Kaitlyn Orta DO    Procedure: Procedure(s):  LEFT WRIST CARPAL TUNNEL RELEASE. SUPINE, WIDE AWAKE BLOCK  MAC + LOCAL    Medications prior to admission:   Prior to Admission medications    Medication Sig Start Date End Date Taking? Authorizing Provider   ALPRAZolam Prudence Sole) 0.5 MG tablet Take 0.5 mg by mouth nightly as needed. . 19   Historical Provider, MD   gabapentin (NEURONTIN) 100 MG capsule Take 100 mg by mouth daily. . 19   Historical Provider, MD   celecoxib (CELEBREX) 200 MG capsule Take 200 mg by mouth daily 18   Historical Provider, MD   SUMAtriptan (IMITREX) 100 MG tablet TAKE 1 TABLET BY MOUTH ONCE AS NEEDED 19   Della Perez MD   oxyCODONE-acetaminophen (PERCOCET) 5-325 MG per tablet Take 1 tablet by mouth every 6 hours as needed. Dr. Young Bridges Pain management specialist 2/15/13   Historical Provider, MD       Current medications:    Current Facility-Administered Medications   Medication Dose Route Frequency Provider Last Rate Last Admin    sodium hyaluronate (viscosup) injection 20 mg  20 mg Intra-artICUlar Once Della Perez MD        sodium hyaluronate (viscosup) injection 20 mg  20 mg Intra-artICUlar Once Della Perez MD        methylPREDNISolone acetate (DEPO-MEDROL) injection 80 mg  80 mg IntraMUSCular Once Della Perez MD         Current Outpatient Medications   Medication Sig Dispense Refill    ALPRAZolam (XANAX) 0.5 MG tablet Take 0.5 mg by mouth nightly as needed. .  2    gabapentin (NEURONTIN) 100 MG capsule Take 100 mg by mouth daily. Seema Velazquez celecoxib (CELEBREX) 200 MG capsule Take 200 mg by mouth daily  2    SUMAtriptan (IMITREX) 100 MG tablet TAKE 1 TABLET BY MOUTH ONCE AS NEEDED 9 tablet 11    oxyCODONE-acetaminophen (PERCOCET) 5-325 MG per tablet Take 1 tablet by mouth every 6 hours as needed. Dr. Coy Ponce Pain management specialist         Allergies:  No Known Allergies    Problem List:    Patient Active Problem List   Diagnosis Code    Anxiety F41.9    Depression F32. A    Anemia D64.9    Metrorrhagia N92.1    Hot flashes R23.2    Internal derangement of knee M23.90    Localized osteoarthrosis, lower leg M17.10       Past Medical History:        Diagnosis Date    Anemia     Anxiety     Arthritis     Depression     Hot flashes 2014    Metrorrhagia        Past Surgical History:        Procedure Laterality Date    BACK SURGERY      PA COLON CA SCRN NOT  [address] IND N/A 3/16/2018    COLONOSCOPY performed by Dionne Bernheim, MD at [address] [place] [address] History:    Social History     Tobacco Use    Smoking status: Former     Packs/day: 0.50     Years: 10.00     Pack years: 5.00     Types: Cigarettes     Start date: 1990     Quit date: 2000     Years since quittin.9    Smokeless tobacco: Never   Substance Use Topics    Alcohol use: Yes     Comment: weekly                                Counseling given: Not Answered      Vital Signs (Current): There were no vitals filed for this visit.                                            BP Readings from Last 3 Encounters:   19 128/82   19 118/72   10/22/18 132/82       NPO Status:                                                                                 BMI:   Wt Readings from Last 3 Encounters:   19 190 lb (86.2 kg)   19 185 lb (83.9 kg)   10/22/18 188 lb (85.3 kg)     There is no height or weight on file to calculate BMI.    CBC:   Lab Results   Component Value Date/Time    WBC 4.4 2015 02:30 PM    RBC 4.17 2015 02:30 PM    HGB 13.1 2015 02:30 PM    HCT 40.1 2015 02:30 PM    MCV 96.0 2015 02:30 PM    RDW 14.7 2015 02:30 PM     2015 02:30 PM       CMP:   Lab Results   Component Value Date/Time     09/08/2014 08:59 PM    K 4.5 09/08/2014 08:59 PM     09/08/2014 08:59 PM    CO2 23 09/08/2014 08:59 PM    BUN 11 09/08/2014 08:59 PM    CREATININE 0.64 09/08/2014 08:59 PM    GFRAA >60.0 09/08/2014 08:59 PM    LABGLOM >60.0 09/08/2014 08:59 PM    GLUCOSE 98 09/08/2014 08:59 PM    PROT 6.5 09/08/2014 08:59 PM    CALCIUM 9.3 09/08/2014 08:59 PM    BILITOT 0.1 09/08/2014 08:59 PM    ALKPHOS 36 09/08/2014 08:59 PM    AST 19 09/08/2014 08:59 PM    ALT 14 09/08/2014 08:59 PM       POC Tests: No results for input(s): POCGLU, POCNA, POCK, POCCL, POCBUN, POCHEMO, POCHCT in the last 72 hours. Coags:   Lab Results   Component Value Date/Time    PROTIME 10.0 08/09/2012 09:42 AM    INR 1.0 08/09/2012 09:42 AM       HCG (If Applicable): No results found for: PREGTESTUR, PREGSERUM, HCG, HCGQUANT     ABGs: No results found for: PHART, PO2ART, IVA5CAN, OEX8YDK, BEART, Z5QRBIML     Type & Screen (If Applicable):  No results found for: LABABO, LABRH    Drug/Infectious Status (If Applicable):  No results found for: HIV, HEPCAB    COVID-19 Screening (If Applicable): No results found for: COVID19        Anesthesia Evaluation  Patient summary reviewed and Nursing notes reviewed no history of anesthetic complications:   Airway: Mallampati: II  TM distance: >3 FB   Neck ROM: full  Mouth opening: > = 3 FB   Dental: normal exam         Pulmonary:Negative Pulmonary ROS breath sounds clear to auscultation                             Cardiovascular:Negative CV ROS  Exercise tolerance: good (>4 METS),         ECG reviewed  Rhythm: regular  Rate: normal           Beta Blocker:  Not on Beta Blocker         Neuro/Psych:   (+) psychiatric history:depression/anxiety             GI/Hepatic/Renal: Neg GI/Hepatic/Renal ROS            Endo/Other:    (+) blood dyscrasia: anemia:., .          Pt had PAT visit. Abdominal:             Vascular: negative vascular ROS.          Other Findings:           Anesthesia Plan      MAC ASA 2             Anesthetic plan and risks discussed with patient.         Attending anesthesiologist reviewed and agrees with Preprocedure content                Tammy Paz DO   11/29/2022

## 2022-11-30 NOTE — PROGRESS NOTES
Patient ID:  Analilia Canseco  [id number]  56 y.o.  1965  TAKEN TO PHASE 2,   ATTACHED TO MONITOR AND REPORT GIVEN TO RN.   VSS DRSG DRY AND INTACT        Electronically signed by Toshia Cason RN on 11/30/2022

## 2023-05-24 PROBLEM — E03.9 HYPOTHYROIDISM: Status: ACTIVE | Noted: 2023-05-24

## 2023-05-24 PROBLEM — F32.A DEPRESSION: Status: ACTIVE | Noted: 2023-05-24

## 2023-05-24 PROBLEM — G43.909 MIGRAINE: Status: ACTIVE | Noted: 2023-05-24

## 2023-05-24 PROBLEM — F41.1 GENERALIZED ANXIETY DISORDER: Status: ACTIVE | Noted: 2023-05-24

## 2023-05-24 PROBLEM — E78.5 DYSLIPIDEMIA: Status: ACTIVE | Noted: 2023-05-24

## 2023-05-24 PROBLEM — M17.12 DEGENERATIVE JOINT DISEASE OF KNEE, LEFT: Status: ACTIVE | Noted: 2023-05-24

## 2023-05-24 PROBLEM — I10 HTN (HYPERTENSION): Status: ACTIVE | Noted: 2023-05-24

## 2023-05-24 PROBLEM — G62.9 NEUROPATHY: Status: ACTIVE | Noted: 2023-05-24

## 2023-05-24 PROBLEM — G56.03 BILATERAL CARPAL TUNNEL SYNDROME: Status: ACTIVE | Noted: 2023-05-24

## 2023-05-25 ENCOUNTER — OFFICE VISIT (OUTPATIENT)
Dept: PRIMARY CARE | Facility: CLINIC | Age: 58
End: 2023-05-25
Payer: COMMERCIAL

## 2023-05-25 VITALS
TEMPERATURE: 98 F | HEART RATE: 75 BPM | DIASTOLIC BLOOD PRESSURE: 80 MMHG | OXYGEN SATURATION: 98 % | BODY MASS INDEX: 37.91 KG/M2 | WEIGHT: 214 LBS | SYSTOLIC BLOOD PRESSURE: 120 MMHG | RESPIRATION RATE: 18 BRPM

## 2023-05-25 DIAGNOSIS — M17.12 OSTEOARTHRITIS OF LEFT KNEE, UNSPECIFIED OSTEOARTHRITIS TYPE: ICD-10-CM

## 2023-05-25 DIAGNOSIS — M25.561 CHRONIC PAIN OF RIGHT KNEE: ICD-10-CM

## 2023-05-25 DIAGNOSIS — E78.5 DYSLIPIDEMIA: ICD-10-CM

## 2023-05-25 DIAGNOSIS — I10 HYPERTENSION, UNSPECIFIED TYPE: ICD-10-CM

## 2023-05-25 DIAGNOSIS — Z12.31 SCREENING MAMMOGRAM, ENCOUNTER FOR: ICD-10-CM

## 2023-05-25 DIAGNOSIS — G89.29 CHRONIC PAIN OF RIGHT KNEE: ICD-10-CM

## 2023-05-25 DIAGNOSIS — E03.9 HYPOTHYROIDISM, UNSPECIFIED TYPE: ICD-10-CM

## 2023-05-25 PROCEDURE — 3008F BODY MASS INDEX DOCD: CPT | Performed by: FAMILY MEDICINE

## 2023-05-25 PROCEDURE — 3079F DIAST BP 80-89 MM HG: CPT | Performed by: FAMILY MEDICINE

## 2023-05-25 PROCEDURE — 99213 OFFICE O/P EST LOW 20 MIN: CPT | Performed by: FAMILY MEDICINE

## 2023-05-25 PROCEDURE — 3074F SYST BP LT 130 MM HG: CPT | Performed by: FAMILY MEDICINE

## 2023-05-25 PROCEDURE — 20605 DRAIN/INJ JOINT/BURSA W/O US: CPT | Performed by: FAMILY MEDICINE

## 2023-05-25 RX ORDER — METHYLPREDNISOLONE ACETATE 80 MG/ML
80 INJECTION, SUSPENSION INTRA-ARTICULAR; INTRALESIONAL; INTRAMUSCULAR; SOFT TISSUE ONCE
Status: COMPLETED | OUTPATIENT
Start: 2023-05-25 | End: 2023-05-25

## 2023-05-25 RX ORDER — ROSUVASTATIN CALCIUM 20 MG/1
20 TABLET, COATED ORAL DAILY
COMMUNITY
End: 2023-08-01 | Stop reason: SDUPTHER

## 2023-05-25 RX ORDER — CELECOXIB 200 MG/1
200 CAPSULE ORAL DAILY
COMMUNITY
End: 2023-07-31 | Stop reason: SDUPTHER

## 2023-05-25 RX ORDER — FLUOXETINE HYDROCHLORIDE 40 MG/1
40 CAPSULE ORAL DAILY
COMMUNITY
Start: 2020-07-27 | End: 2023-07-31 | Stop reason: SDUPTHER

## 2023-05-25 RX ORDER — METHYLPREDNISOLONE ACETATE 80 MG/ML
80 INJECTION, SUSPENSION INTRA-ARTICULAR; INTRALESIONAL; INTRAMUSCULAR; SOFT TISSUE ONCE
Status: DISCONTINUED | OUTPATIENT
Start: 2023-05-25 | End: 2023-05-25

## 2023-05-25 RX ORDER — ALPRAZOLAM 0.5 MG/1
0.5 TABLET ORAL
COMMUNITY
Start: 2013-02-15 | End: 2024-03-28 | Stop reason: SDUPTHER

## 2023-05-25 RX ORDER — SUMATRIPTAN SUCCINATE 100 MG/1
100 TABLET ORAL ONCE AS NEEDED
COMMUNITY
End: 2023-06-15 | Stop reason: SDUPTHER

## 2023-05-25 RX ADMIN — METHYLPREDNISOLONE ACETATE 80 MG: 80 INJECTION, SUSPENSION INTRA-ARTICULAR; INTRALESIONAL; INTRAMUSCULAR; SOFT TISSUE at 14:23

## 2023-05-25 ASSESSMENT — PATIENT HEALTH QUESTIONNAIRE - PHQ9
SUM OF ALL RESPONSES TO PHQ9 QUESTIONS 1 AND 2: 0
2. FEELING DOWN, DEPRESSED OR HOPELESS: NOT AT ALL
1. LITTLE INTEREST OR PLEASURE IN DOING THINGS: NOT AT ALL

## 2023-05-25 NOTE — PROGRESS NOTES
Subjective   Patient ID: Bri Mays is a 58 y.o. female who presents for No chief complaint on file..    HPI  Pt was seen last year  Is here requesting a cortisone injection in her RIGHT knee  Pt has had these in the past  Generally have lasted 3-6 months  LEFT knee was replaced      Last eye exam: 6/2022  Last dental: Feb 2022  Last PAP: unknown No abnormal ones in past  Pt tries to follow low fat/sugar/salt diet  Exercises: none  Denies SOB/chest pain/palpitations  Pt is not a smoker  No urinary or bowel issues per pt.  Sleeping well  Medications working well    Pt has carpal tunnel surgery on both hands and gets nodules  Wonders if this is normal    Labs and mammogram ordered    Review of Systems  Constitutional:  no chills, no fever and no night sweats.  Eyes: no blurred vision and no eyesight problems.  ENT: no hearing loss, no nasal congestion, no hoarseness and no sore throat.  Neck: no mass (es) and no swelling.  Cardiovascular: no chest pain, no intermittent leg claudication, no lower extremity edema, no palpitation and no syncope.  Respiratory: no cough, no shortness of breath during exertion, no shortness of breath at rest and no wheezing.  Gastrointestinal: no abdominal pain, no blood in stools, no constipation, no diarrhea, no melena, no nausea, no rectal pain and no vomiting.  Genitourinary: no dysuria, no change in urinary frequency, no urinary hesitancy and no feelings of urinary urgency.  Musculoskeletal: no arthralgias, no back pain and no myalgias.  Integumentary: no new skin lesions and no rashes.  Neurological: no difficulty walking, no headache, no limb weakness, no numbness and no tingling.  Psychiatric/Behavioral: no anxiety, no depression, no anhedonia and no substance use disorders.  Endocrine: no recent weight gain and no recent weight loss.  Hematologic/Lymphatic: no tendency for easy bruising and no swollen glands    Objective   Physical Exam  Patient in for follow-up chronic  knee pain worse on the right side now notes trying to hold off on knee replacement.  No help with over-the-counter anti-inflammatories we will give her a cortisone shot in the knee and see how that works.  Right knee prepped in sterile manner anesthesia with lidocaine with epinephrine 80 mg combination lidocaine and Depo-Medrol and injected in the knee.  She tolerated the procedure well.  If she develops redness swelling pain tenderness bleeding bruising fever chills or any other problems she is to let us know.  Activity as tolerated.  There were no vitals taken for this visit.    Lab Results   Component Value Date    WBC 3.9 (L) 06/27/2022    HGB 14.2 06/27/2022    HCT 43.7 06/27/2022    MCV 96 06/27/2022     06/27/2022       Assessment/Plan follow-up follow-up if this does not help the knee pain.  We could also consider gel shots.  Refill her medication she is due for mammogram and blood work we will get those scheduled and follow-up we have the results  Problem List Items Addressed This Visit    None

## 2023-06-15 DIAGNOSIS — G43.909 MIGRAINE WITHOUT STATUS MIGRAINOSUS, NOT INTRACTABLE, UNSPECIFIED MIGRAINE TYPE: ICD-10-CM

## 2023-06-16 RX ORDER — SUMATRIPTAN SUCCINATE 100 MG/1
100 TABLET ORAL ONCE AS NEEDED
Qty: 9 TABLET | Refills: 2 | Status: SHIPPED | OUTPATIENT
Start: 2023-06-16 | End: 2024-01-09

## 2023-07-31 DIAGNOSIS — F41.1 GENERALIZED ANXIETY DISORDER: ICD-10-CM

## 2023-07-31 DIAGNOSIS — M17.12 OSTEOARTHRITIS OF LEFT KNEE, UNSPECIFIED OSTEOARTHRITIS TYPE: ICD-10-CM

## 2023-07-31 DIAGNOSIS — M25.561 CHRONIC PAIN OF RIGHT KNEE: ICD-10-CM

## 2023-07-31 DIAGNOSIS — G89.29 CHRONIC PAIN OF RIGHT KNEE: ICD-10-CM

## 2023-07-31 RX ORDER — FLUOXETINE HYDROCHLORIDE 40 MG/1
40 CAPSULE ORAL DAILY
Qty: 30 CAPSULE | Refills: 2 | Status: SHIPPED | OUTPATIENT
Start: 2023-07-31 | End: 2024-02-02

## 2023-07-31 RX ORDER — CELECOXIB 200 MG/1
200 CAPSULE ORAL DAILY
Qty: 30 CAPSULE | Refills: 2 | Status: SHIPPED | OUTPATIENT
Start: 2023-07-31 | End: 2023-10-27

## 2023-07-31 NOTE — TELEPHONE ENCOUNTER
Rx Refill Request Telephone Encounter    Name:  Bri Mays  : 1965     Medication Name:  Celebrex  Dose (Optional):    200mg  Quantity (Optional):      Directions (Optional):   Take 1 capsule (200mg) by mouth once daily    ALLERGIES:   nkda    Specific Pharmacy location:  [facility]    Date of last appointment:  23  Date of next appointment:  none    Best number to reach patient:  [phone]      Rx Refill Request Telephone Encounter    Name:  Bri Mays  : 1965     Medication Name:  Prozac  Dose (Optional):    40mg  Quantity (Optional):      Directions (Optional):   Take 1 capsule (40mg) by mouth once daily    ALLERGIES:   nkda    Specific Pharmacy location:  [facility]    Date of last appointment:  23  Date of next appointment:  none    Best number to reach patient:  [phone]

## 2023-08-01 DIAGNOSIS — E78.5 DYSLIPIDEMIA: ICD-10-CM

## 2023-08-01 RX ORDER — ROSUVASTATIN CALCIUM 20 MG/1
20 TABLET, COATED ORAL DAILY
Qty: 30 TABLET | Refills: 3 | Status: SHIPPED | OUTPATIENT
Start: 2023-08-01 | End: 2023-10-27

## 2023-09-02 PROBLEM — R92.8 ABNORMAL MAMMOGRAM: Status: ACTIVE | Noted: 2023-09-02

## 2023-09-02 PROBLEM — N92.1 METRORRHAGIA: Status: ACTIVE | Noted: 2023-09-02

## 2023-09-02 PROBLEM — R73.09 ELEVATED GLUCOSE: Status: ACTIVE | Noted: 2023-09-02

## 2023-09-02 PROBLEM — R63.5 WEIGHT GAIN: Status: ACTIVE | Noted: 2023-09-02

## 2023-09-02 PROBLEM — R74.01 ELEVATED AST (SGOT): Status: ACTIVE | Noted: 2023-09-02

## 2023-09-02 PROBLEM — G56.00 CARPAL TUNNEL SYNDROME: Status: ACTIVE | Noted: 2023-09-02

## 2023-09-02 PROBLEM — M65.319 TRIGGER THUMB: Status: ACTIVE | Noted: 2023-09-02

## 2023-09-02 PROBLEM — R53.83 MALAISE AND FATIGUE: Status: ACTIVE | Noted: 2023-09-02

## 2023-09-02 PROBLEM — R63.4 WEIGHT LOSS: Status: ACTIVE | Noted: 2023-09-02

## 2023-09-02 PROBLEM — D64.9 ANEMIA: Status: ACTIVE | Noted: 2023-09-02

## 2023-09-02 PROBLEM — R53.81 MALAISE AND FATIGUE: Status: ACTIVE | Noted: 2023-09-02

## 2023-09-02 PROBLEM — M17.11 PRIMARY OSTEOARTHRITIS OF RIGHT KNEE: Status: ACTIVE | Noted: 2023-09-02

## 2023-09-02 PROBLEM — F41.9 ANXIETY: Status: ACTIVE | Noted: 2023-09-02

## 2023-09-02 RX ORDER — PEN NEEDLE, DIABETIC 32GX 5/32"
NEEDLE, DISPOSABLE MISCELLANEOUS
COMMUNITY
Start: 2023-07-21

## 2023-09-02 RX ORDER — SOD SULF/POT CHLORIDE/MAG SULF 1.479 G
TABLET ORAL
COMMUNITY

## 2023-09-02 RX ORDER — HYDROCODONE BITARTRATE AND ACETAMINOPHEN 5; 325 MG/1; MG/1
1 TABLET ORAL EVERY 8 HOURS PRN
COMMUNITY
Start: 2022-11-28

## 2023-10-09 ENCOUNTER — OFFICE VISIT (OUTPATIENT)
Dept: ORTHOPEDIC SURGERY | Facility: CLINIC | Age: 58
End: 2023-10-09
Payer: COMMERCIAL

## 2023-10-09 DIAGNOSIS — M17.11 PRIMARY OSTEOARTHRITIS OF RIGHT KNEE: Primary | ICD-10-CM

## 2023-10-09 PROCEDURE — 99214 OFFICE O/P EST MOD 30 MIN: CPT | Performed by: ORTHOPAEDIC SURGERY

## 2023-10-09 PROCEDURE — 1036F TOBACCO NON-USER: CPT | Performed by: ORTHOPAEDIC SURGERY

## 2023-10-09 PROCEDURE — 3008F BODY MASS INDEX DOCD: CPT | Performed by: ORTHOPAEDIC SURGERY

## 2023-10-09 NOTE — PROGRESS NOTES
History: Bri is here for her right knee.  She is having some recurrent pain in the knee.  She had injections in the past.  She is tried medications exercise and bracing.    Past medical history: Multiple  Medications: Multiple  Allergies: No known drug allergies    Please refer to the intake H&P regarding the patient's review of systems, family history and social history as was done today    HEENT: Normal  Lungs: Clear to auscultation  Heart: RRR  Abdomen: Soft, nontender  Skin: clear  Extremity: She has tenderness again more medially.  1+ crepitus with range of motion.  Mild laxity with varus stress.  Negative Lachman and posterior drawer.  Skin is clear otherwise.  No numbness.  Contralateral exam is normal for strength, motion, stability and neurovascular assessment.    Radiographs: X-rays show near bone-on-bone articulation medially with varus alignment.    Assessment: End-stage right knee DJD    Plan: She has had injections in the past with mild improvement but nothing lasting long-term.  She is not getting much relief with medication, bracing or exercises.  At this point she would rather proceed with total knee replacement.  We discussed the risk and benefits as well as the extensive rehab needed with such surgery.  We can proceed at her discretion and upon clearance.  We will see her back preoperatively.

## 2023-10-19 ENCOUNTER — HOSPITAL ENCOUNTER (OUTPATIENT)
Dept: MRI IMAGING | Age: 58
Discharge: HOME | End: 2023-10-21
Attending: ORTHOPAEDIC SURGERY
Payer: COMMERCIAL

## 2023-10-19 DIAGNOSIS — M17.11 OSTEOARTHRITIS OF RIGHT KNEE, UNSPECIFIED OSTEOARTHRITIS TYPE: ICD-10-CM

## 2023-10-19 PROCEDURE — 73721 MRI JNT OF LWR EXTRE W/O DYE: CPT

## 2023-10-27 DIAGNOSIS — M17.12 OSTEOARTHRITIS OF LEFT KNEE, UNSPECIFIED OSTEOARTHRITIS TYPE: ICD-10-CM

## 2023-10-27 DIAGNOSIS — E78.5 DYSLIPIDEMIA: ICD-10-CM

## 2023-10-27 RX ORDER — CELECOXIB 200 MG/1
200 CAPSULE ORAL DAILY
Qty: 30 CAPSULE | Refills: 2 | Status: SHIPPED | OUTPATIENT
Start: 2023-10-27 | End: 2024-03-07

## 2023-10-27 RX ORDER — ROSUVASTATIN CALCIUM 20 MG/1
20 TABLET, COATED ORAL DAILY
Qty: 90 TABLET | Refills: 0 | Status: SHIPPED | OUTPATIENT
Start: 2023-10-27 | End: 2024-01-09

## 2023-12-06 NOTE — PROGRESS NOTES
Subjective   Patient ID: Bri Mays is a 58 y.o. female who presents for Knee Pain and procedure visit.    HPI  Presents for cortisone injection to RIGHT knee  Procedure explained to pt.  Verbalized understanding and agreeable to treatment today IO    No other concerns today    No other concerns today    Yearly BW ordered 5/25/2023. Pt reminded to do  Mammogram screen ordered 5/25/2023. Pt reminded to do  Flu vaccine already received    Review of Systems  Constitutional:  no chills, no fever and no night sweats.  Eyes: no blurred vision and no eyesight problems.  ENT: no hearing loss, no nasal congestion, no hoarseness and no sore throat.  Neck: no mass (es) and no swelling.  Cardiovascular: no chest pain, no intermittent leg claudication, no lower extremity edema, no palpitation and no syncope.  Respiratory: no cough, no shortness of breath during exertion, no shortness of breath at rest and no wheezing.  Gastrointestinal: no abdominal pain, no blood in stools, no constipation, no diarrhea, no melena, no nausea, no rectal pain and no vomiting.  Genitourinary: no dysuria, no change in urinary frequency, no urinary hesitancy and no feelings of urinary urgency.  Musculoskeletal: no arthralgias, no back pain and no myalgias.  Integumentary: no new skin lesions and no rashes.  Neurological: no difficulty walking, no headache, no limb weakness, no numbness and no tingling.  Psychiatric/Behavioral: no anxiety, no depression, no anhedonia and no substance use disorders.  Endocrine: no recent weight gain and no recent weight loss.  Hematologic/Lymphatic: no tendency for easy bruising and no swollen glands    Objective   Physical Exam    There were no vitals taken for this visit.    Lab Results   Component Value Date    WBC 3.9 (L) 06/27/2022    HGB 14.2 06/27/2022    HCT 43.7 06/27/2022    MCV 96 06/27/2022     06/27/2022       Assessment/Plan   Problem List Items Addressed This Visit    None

## 2023-12-07 ENCOUNTER — PROCEDURE VISIT (OUTPATIENT)
Dept: PRIMARY CARE | Facility: CLINIC | Age: 58
End: 2023-12-07
Payer: COMMERCIAL

## 2023-12-07 VITALS
WEIGHT: 215.2 LBS | RESPIRATION RATE: 18 BRPM | SYSTOLIC BLOOD PRESSURE: 122 MMHG | OXYGEN SATURATION: 96 % | DIASTOLIC BLOOD PRESSURE: 74 MMHG | HEART RATE: 91 BPM | BODY MASS INDEX: 38.23 KG/M2 | TEMPERATURE: 98 F

## 2023-12-07 DIAGNOSIS — G89.29 CHRONIC PAIN OF RIGHT KNEE: Primary | ICD-10-CM

## 2023-12-07 DIAGNOSIS — M25.561 CHRONIC PAIN OF RIGHT KNEE: Primary | ICD-10-CM

## 2023-12-07 NOTE — PROGRESS NOTES
Subjective   Patient ID: Bri Mays is a 58 y.o. female who presents for Pre-op Exam.  HPI  Patient presents today for a Pre-Op Exam. Is having a right total knee replacement on 12-26-23 at [facility]. This will be performed by Dr. Dhiraj Nair.      Has no other new problem /question.        Review of Systems  Constitutional:  no chills, no fever and no night sweats.  Eyes: no blurred vision and no eyesight problems.  ENT: no hearing loss, no nasal congestion, no hoarseness and no sore throat.  Neck: no mass (es) and no swelling.  Cardiovascular: no chest pain, no intermittent leg claudication, no lower extremity edema, no palpitation and no syncope.  Respiratory: no cough, no shortness of breath during exertion, no shortness of breath at rest and no wheezing.  Gastrointestinal: no abdominal pain, no blood in stools, no constipation, no diarrhea, no melena, no nausea, no rectal pain and no vomiting.  Genitourinary: no dysuria, no change in urinary frequency, no urinary hesitancy and no feelings of urinary urgency.  Musculoskeletal: no arthralgias, no back pain and no myalgias.  Integumentary: no new skin lesions and no rashes.  Neurological: no difficulty walking, no headache, no limb weakness, no numbness and no tingling.  Psychiatric/Behavioral: no anxiety, no depression, no anhedonia and no substance use disorders.  Endocrine: no recent weight gain and no recent weight loss.  Hematologic/Lymphatic: no tendency for easy bruising and no swollen glands    Objective   Physical Exam  And for preop testing right knee replacement scheduled for December 26 with Dr. Yvan Nair.  She is going to [place] this afternoon with her granddaughter for the weekend knee has been bothering her more she had initially requested a cortisone injection we discussed it with Dr. Nair he advised if she gets the shot the surgery is delayed by 3 months we therefore will not give her the shot.  She is given something for  "pain to get her through the weekend she will follow-up with us next week when she gets back if she has any problems or get blood drawn before she leaves this afternoon.  Follow-up with me of the results  /80   Pulse 78   Temp 37 °C (98.6 °F)   Resp 16   Ht 1.598 m (5' 2.91\")   SpO2 99%   BMI 38.23 kg/m²     Lab Results   Component Value Date    WBC 3.9 (L) 06/27/2022    HGB 14.2 06/27/2022    HCT 43.7 06/27/2022    MCV 96 06/27/2022     06/27/2022       Assessment/Plan EKG normal sinus get blood drawn she is cleared for the surgery.  Problem List Items Addressed This Visit       Dyslipidemia    Relevant Orders    CBC and Auto Differential    Comprehensive metabolic panel    Lipid panel    Hypothyroidism    Relevant Orders    T4, free    Malaise and fatigue    Relevant Orders    CBC and Auto Differential    Comprehensive metabolic panel    Lipid panel    Coagulation Screen    Urinalysis with Reflex Microscopic    Urine Culture    Staphylococcus aureus/MRSA colonization, Culture     Other Visit Diagnoses       Pre-op examination    -  Primary    Relevant Orders    CBC and Auto Differential    Comprehensive metabolic panel    Coagulation Screen    Urinalysis with Reflex Microscopic    Urine Culture    Staphylococcus aureus/MRSA colonization, Culture    ECG 12 lead (Clinic Performed)    Chronic pain of right knee        Relevant Orders    CBC and Auto Differential    Comprehensive metabolic panel    Coagulation Screen    Urinalysis with Reflex Microscopic    Urine Culture    Staphylococcus aureus/MRSA colonization, Culture            "

## 2023-12-08 ENCOUNTER — TELEPHONE (OUTPATIENT)
Dept: PRIMARY CARE | Facility: CLINIC | Age: 58
End: 2023-12-08

## 2023-12-08 ENCOUNTER — OFFICE VISIT (OUTPATIENT)
Dept: PRIMARY CARE | Facility: CLINIC | Age: 58
End: 2023-12-08
Payer: COMMERCIAL

## 2023-12-08 ENCOUNTER — LAB (OUTPATIENT)
Dept: LAB | Facility: LAB | Age: 58
End: 2023-12-08
Payer: COMMERCIAL

## 2023-12-08 VITALS
HEART RATE: 78 BPM | RESPIRATION RATE: 16 BRPM | SYSTOLIC BLOOD PRESSURE: 124 MMHG | DIASTOLIC BLOOD PRESSURE: 80 MMHG | TEMPERATURE: 98.6 F | BODY MASS INDEX: 38.23 KG/M2 | HEIGHT: 63 IN | OXYGEN SATURATION: 99 %

## 2023-12-08 DIAGNOSIS — E03.9 HYPOTHYROIDISM, UNSPECIFIED TYPE: ICD-10-CM

## 2023-12-08 DIAGNOSIS — R53.83 MALAISE AND FATIGUE: ICD-10-CM

## 2023-12-08 DIAGNOSIS — I10 HYPERTENSION, UNSPECIFIED TYPE: ICD-10-CM

## 2023-12-08 DIAGNOSIS — Z01.818 PRE-OP EXAMINATION: Primary | ICD-10-CM

## 2023-12-08 DIAGNOSIS — R53.81 MALAISE AND FATIGUE: ICD-10-CM

## 2023-12-08 DIAGNOSIS — E78.5 DYSLIPIDEMIA: ICD-10-CM

## 2023-12-08 DIAGNOSIS — G89.29 CHRONIC PAIN OF RIGHT KNEE: ICD-10-CM

## 2023-12-08 DIAGNOSIS — M25.561 CHRONIC PAIN OF RIGHT KNEE: ICD-10-CM

## 2023-12-08 DIAGNOSIS — Z01.818 PRE-OP EXAMINATION: ICD-10-CM

## 2023-12-08 LAB
ALBUMIN SERPL BCP-MCNC: 4.5 G/DL (ref 3.4–5)
ALP SERPL-CCNC: 55 U/L (ref 33–110)
ALT SERPL W P-5'-P-CCNC: 29 U/L (ref 7–45)
ANION GAP SERPL CALC-SCNC: 14 MMOL/L (ref 10–20)
APPEARANCE UR: CLEAR
APTT PPP: 29 SECONDS (ref 27–38)
AST SERPL W P-5'-P-CCNC: 24 U/L (ref 9–39)
BASOPHILS # BLD AUTO: 0.02 X10*3/UL (ref 0–0.1)
BASOPHILS NFR BLD AUTO: 0.4 %
BILIRUB SERPL-MCNC: 0.5 MG/DL (ref 0–1.2)
BILIRUB UR STRIP.AUTO-MCNC: NEGATIVE MG/DL
BUN SERPL-MCNC: 17 MG/DL (ref 6–23)
CALCIUM SERPL-MCNC: 9.7 MG/DL (ref 8.6–10.3)
CHLORIDE SERPL-SCNC: 103 MMOL/L (ref 98–107)
CHOLEST SERPL-MCNC: 236 MG/DL (ref 0–199)
CHOLESTEROL/HDL RATIO: 3
CO2 SERPL-SCNC: 26 MMOL/L (ref 21–32)
COLOR UR: YELLOW
CREAT SERPL-MCNC: 0.68 MG/DL (ref 0.5–1.05)
EOSINOPHIL # BLD AUTO: 0.19 X10*3/UL (ref 0–0.7)
EOSINOPHIL NFR BLD AUTO: 4 %
ERYTHROCYTE [DISTWIDTH] IN BLOOD BY AUTOMATED COUNT: 13.7 % (ref 11.5–14.5)
GFR SERPL CREATININE-BSD FRML MDRD: >90 ML/MIN/1.73M*2
GLUCOSE SERPL-MCNC: 95 MG/DL (ref 74–99)
GLUCOSE UR STRIP.AUTO-MCNC: NEGATIVE MG/DL
HCT VFR BLD AUTO: 41.3 % (ref 36–46)
HDLC SERPL-MCNC: 79.6 MG/DL
HGB BLD-MCNC: 13.6 G/DL (ref 12–16)
IMM GRANULOCYTES # BLD AUTO: 0.01 X10*3/UL (ref 0–0.7)
IMM GRANULOCYTES NFR BLD AUTO: 0.2 % (ref 0–0.9)
INR PPP: 0.9 (ref 0.9–1.1)
KETONES UR STRIP.AUTO-MCNC: NEGATIVE MG/DL
LDLC SERPL CALC-MCNC: 138 MG/DL
LEUKOCYTE ESTERASE UR QL STRIP.AUTO: NEGATIVE
LYMPHOCYTES # BLD AUTO: 1.69 X10*3/UL (ref 1.2–4.8)
LYMPHOCYTES NFR BLD AUTO: 35.5 %
MCH RBC QN AUTO: 32.2 PG (ref 26–34)
MCHC RBC AUTO-ENTMCNC: 32.9 G/DL (ref 32–36)
MCV RBC AUTO: 98 FL (ref 80–100)
MONOCYTES # BLD AUTO: 0.39 X10*3/UL (ref 0.1–1)
MONOCYTES NFR BLD AUTO: 8.2 %
NEUTROPHILS # BLD AUTO: 2.46 X10*3/UL (ref 1.2–7.7)
NEUTROPHILS NFR BLD AUTO: 51.7 %
NITRITE UR QL STRIP.AUTO: NEGATIVE
NON HDL CHOLESTEROL: 156 MG/DL (ref 0–149)
NRBC BLD-RTO: 0 /100 WBCS (ref 0–0)
PH UR STRIP.AUTO: 5 [PH]
PLATELET # BLD AUTO: 277 X10*3/UL (ref 150–450)
POTASSIUM SERPL-SCNC: 4.4 MMOL/L (ref 3.5–5.3)
PROT SERPL-MCNC: 6.5 G/DL (ref 6.4–8.2)
PROT UR STRIP.AUTO-MCNC: NEGATIVE MG/DL
PROTHROMBIN TIME: 9.9 SECONDS (ref 9.8–12.8)
RBC # BLD AUTO: 4.23 X10*6/UL (ref 4–5.2)
RBC # UR STRIP.AUTO: NEGATIVE /UL
SODIUM SERPL-SCNC: 139 MMOL/L (ref 136–145)
SP GR UR STRIP.AUTO: 1.02
T4 FREE SERPL-MCNC: 1.01 NG/DL (ref 0.61–1.12)
T4 SERPL-MCNC: 7.9 UG/DL (ref 4.5–11.1)
TRIGL SERPL-MCNC: 90 MG/DL (ref 0–149)
UROBILINOGEN UR STRIP.AUTO-MCNC: <2 MG/DL
VLDL: 18 MG/DL (ref 0–40)
WBC # BLD AUTO: 4.8 X10*3/UL (ref 4.4–11.3)

## 2023-12-08 PROCEDURE — 87081 CULTURE SCREEN ONLY: CPT

## 2023-12-08 PROCEDURE — 1036F TOBACCO NON-USER: CPT | Performed by: FAMILY MEDICINE

## 2023-12-08 PROCEDURE — 99213 OFFICE O/P EST LOW 20 MIN: CPT | Performed by: FAMILY MEDICINE

## 2023-12-08 PROCEDURE — 80061 LIPID PANEL: CPT

## 2023-12-08 PROCEDURE — 84439 ASSAY OF FREE THYROXINE: CPT

## 2023-12-08 PROCEDURE — 93000 ELECTROCARDIOGRAM COMPLETE: CPT | Performed by: FAMILY MEDICINE

## 2023-12-08 PROCEDURE — 85730 THROMBOPLASTIN TIME PARTIAL: CPT

## 2023-12-08 PROCEDURE — 80053 COMPREHEN METABOLIC PANEL: CPT

## 2023-12-08 PROCEDURE — 36415 COLL VENOUS BLD VENIPUNCTURE: CPT

## 2023-12-08 PROCEDURE — 3079F DIAST BP 80-89 MM HG: CPT | Performed by: FAMILY MEDICINE

## 2023-12-08 PROCEDURE — 3008F BODY MASS INDEX DOCD: CPT | Performed by: FAMILY MEDICINE

## 2023-12-08 PROCEDURE — 81003 URINALYSIS AUTO W/O SCOPE: CPT

## 2023-12-08 PROCEDURE — 85610 PROTHROMBIN TIME: CPT

## 2023-12-08 PROCEDURE — 3074F SYST BP LT 130 MM HG: CPT | Performed by: FAMILY MEDICINE

## 2023-12-08 PROCEDURE — 84436 ASSAY OF TOTAL THYROXINE: CPT

## 2023-12-08 PROCEDURE — 87086 URINE CULTURE/COLONY COUNT: CPT

## 2023-12-08 PROCEDURE — 85025 COMPLETE CBC W/AUTO DIFF WBC: CPT

## 2023-12-08 RX ORDER — OXYCODONE AND ACETAMINOPHEN 5; 325 MG/1; MG/1
1 TABLET ORAL EVERY 8 HOURS PRN
Qty: 21 TABLET | Refills: 0 | Status: SHIPPED | OUTPATIENT
Start: 2023-12-08 | End: 2023-12-15 | Stop reason: SDUPTHER

## 2023-12-08 NOTE — TELEPHONE ENCOUNTER
Called Dr Dhiraj Nair's office and had to leave a message for their office. Patient has an appointment for surgery on 12/26/23 with Dr Nair and she has an appointment with Dr Tello today at 9:15am for PAT. He wanted to know if he could give her a cortisone injection today or would that interfere with surgery?  Also faxed them this question too  Gave them the back line # as well

## 2023-12-08 NOTE — TELEPHONE ENCOUNTER
Patient did not get the shot per Dr Dhiraj Nair advised that this would delay surgery. See office note

## 2023-12-09 LAB — BACTERIA UR CULT: ABNORMAL

## 2023-12-11 LAB — STAPHYLOCOCCUS SPEC CULT: NORMAL

## 2023-12-15 ENCOUNTER — TELEPHONE (OUTPATIENT)
Dept: PRIMARY CARE | Facility: CLINIC | Age: 58
End: 2023-12-15
Payer: COMMERCIAL

## 2023-12-15 DIAGNOSIS — M25.561 CHRONIC PAIN OF RIGHT KNEE: ICD-10-CM

## 2023-12-15 DIAGNOSIS — G89.29 CHRONIC PAIN OF RIGHT KNEE: ICD-10-CM

## 2023-12-15 RX ORDER — OXYCODONE AND ACETAMINOPHEN 5; 325 MG/1; MG/1
1 TABLET ORAL EVERY 8 HOURS PRN
Qty: 21 TABLET | Refills: 0 | Status: SHIPPED | OUTPATIENT
Start: 2023-12-15 | End: 2023-12-22

## 2023-12-15 NOTE — TELEPHONE ENCOUNTER
Rx Controlled Refill Request Telephone Encounter    PATIENT STATED DR RUTHERFORD SAID FOR HER TO CALL IN IF SHE NEEDED 1 MORE WEEKS WORTH OF THE PAIN MEDICATION.  PLEASE ADVISE     Name: Bri Mays  :  1965    Medication Name:   oxyCODONE-acetaminophen (Percocet)   Dose (Optional):   5-325 MG   Quantity (Optional):   21  Directions (Optional):   TAKE 1 TABLET BY MOUTH EVERY 8 HRS IF NEEDED FOR SEVEE PAIN FOR UP TO 7 DAYS     ALLERGIES:    NO KNOWN ALLERGIES   LAST DRUG SCREEN:     NONE   LAST MED CONTRACT:    NONE     Specific Pharmacy location:    [facility]     Date of last appointment:    23  Date of next appointment:    NOT SCHEDULED     Best number to reach patient:    [phone]

## 2023-12-18 ENCOUNTER — TELEPHONE (OUTPATIENT)
Dept: PRIMARY CARE | Facility: CLINIC | Age: 58
End: 2023-12-18
Payer: COMMERCIAL

## 2023-12-18 DIAGNOSIS — N30.00 ACUTE CYSTITIS WITHOUT HEMATURIA: Primary | ICD-10-CM

## 2023-12-18 RX ORDER — CIPROFLOXACIN 500 MG/1
500 TABLET ORAL 2 TIMES DAILY
Qty: 10 TABLET | Refills: 0 | Status: SHIPPED | OUTPATIENT
Start: 2023-12-18 | End: 2023-12-23

## 2023-12-18 NOTE — TELEPHONE ENCOUNTER
Surgery on 12/26. PAT urine culture came back abnormal for strep B    [facility] calling to see if DP is going to treat this    PT USES [facility] on [address] in [place]

## 2023-12-20 ENCOUNTER — OFFICE VISIT (OUTPATIENT)
Dept: ORTHOPEDIC SURGERY | Facility: CLINIC | Age: 58
End: 2023-12-20
Payer: COMMERCIAL

## 2023-12-20 ENCOUNTER — APPOINTMENT (OUTPATIENT)
Dept: PHYSICAL THERAPY | Facility: CLINIC | Age: 58
End: 2023-12-20
Payer: COMMERCIAL

## 2023-12-20 DIAGNOSIS — M17.11 PRIMARY OSTEOARTHRITIS OF RIGHT KNEE: Primary | ICD-10-CM

## 2023-12-20 PROCEDURE — 1036F TOBACCO NON-USER: CPT | Performed by: PHYSICIAN ASSISTANT

## 2023-12-20 PROCEDURE — 99024 POSTOP FOLLOW-UP VISIT: CPT | Performed by: PHYSICIAN ASSISTANT

## 2023-12-20 NOTE — PROGRESS NOTES
Bri Mays is here today for a pre-op visit of her right knee.  She is scheduled for right total knee arthroplasty.    This is a pre-op visit to discuss the risks and benefits of surgery. The risks and benefits were fully explained to the patient. The patient wishes to proceed.     With any surgery, infection is a risk; usually 1-2%. It can become higher for individuals with diabetes, rheumatoid arthritis, previous surgery, individuals on oral steroids, or obese individuals. All of these issues were properly addressed and considered. Reassured all sterile techniques would be followed.  Severe infections may require removal of any prosthesis.    The patient will be given preop antibiotics. It was also explained to the patient that there will be some blood loss during the procedure but that blood transfusion is usually not necessary. It is also noted that with knee surgery a tourniquet is applied to lower the amount of blood loss during the case.    Pulmonary embolism and blood clots were also discussed with the patient and told that these can have fatal complications. It is explained to the patient that the use of FRAN hose, foot pumps, incentive spirometers, quick mobility and the use of blood thinners/Aspirin for a period of 21-28 days is the standard preventative care.     It was explained that surgery is often used to decrease pain, provide stability, and improve strength but individuals will have varying results postoperatively. There can be variation in motion and a risk of stiffness. It is also stated that occasionally we will have to manipulate an extremity if pain and stiffness persist. We also discussed there are limitations with some motions after certain surgeries.     Fracture, though rare, may also occur intraoperatively. There is also the possibility of nerve or vascular injuries as well. There is potential for continued numbness or tingling. The benefits of anesthesia were explained to the  patient.     All of the patient's questions were answered.     Assessment: Right knee DJD    Plan:   I have personally reviewed the OARRS report for this patient. This report is scanned into the electronic medical record. I have considered the risks of abuse, dependence, addiction, and diversion.     She will follow up 2 weeks post op.    This note was generated with voice recognition software and may contain grammatical errors.

## 2023-12-22 ENCOUNTER — ANESTHESIA EVENT (OUTPATIENT)
Dept: OPERATING ROOM | Age: 58
End: 2023-12-22
Payer: COMMERCIAL

## 2023-12-22 ENCOUNTER — PREP FOR PROCEDURE (OUTPATIENT)
Dept: ORTHOPEDIC SURGERY | Age: 58
End: 2023-12-22

## 2023-12-22 DIAGNOSIS — Z96.651 STATUS POST TOTAL RIGHT KNEE REPLACEMENT: Primary | ICD-10-CM

## 2023-12-22 RX ORDER — SODIUM CHLORIDE 0.9 % (FLUSH) 0.9 %
5-40 SYRINGE (ML) INJECTION EVERY 12 HOURS SCHEDULED
Status: CANCELLED | OUTPATIENT
Start: 2023-12-22

## 2023-12-22 RX ORDER — SODIUM CHLORIDE 9 MG/ML
INJECTION, SOLUTION INTRAVENOUS PRN
Status: CANCELLED | OUTPATIENT
Start: 2023-12-22

## 2023-12-22 RX ORDER — OXYCODONE HYDROCHLORIDE 5 MG/1
5 TABLET ORAL EVERY 4 HOURS PRN
Status: CANCELLED | OUTPATIENT
Start: 2023-12-22

## 2023-12-22 RX ORDER — SENNA AND DOCUSATE SODIUM 50; 8.6 MG/1; MG/1
1 TABLET, FILM COATED ORAL 2 TIMES DAILY
Status: CANCELLED | OUTPATIENT
Start: 2023-12-22

## 2023-12-22 RX ORDER — TRANEXAMIC ACID 650 MG/1
1950 TABLET ORAL EVERY 8 HOURS
Status: CANCELLED | OUTPATIENT
Start: 2023-12-22 | End: 2023-12-22

## 2023-12-22 RX ORDER — TRANEXAMIC ACID 650 MG/1
1950 TABLET ORAL ONCE
Status: CANCELLED | OUTPATIENT
Start: 2023-12-23 | End: 2023-12-23

## 2023-12-22 RX ORDER — ASPIRIN 81 MG/1
81 TABLET ORAL 2 TIMES DAILY
Status: CANCELLED | OUTPATIENT
Start: 2023-12-22

## 2023-12-22 RX ORDER — ONDANSETRON 4 MG/1
4 TABLET, ORALLY DISINTEGRATING ORAL EVERY 8 HOURS PRN
Status: CANCELLED | OUTPATIENT
Start: 2023-12-22

## 2023-12-22 RX ORDER — MORPHINE SULFATE 2 MG/ML
2 INJECTION, SOLUTION INTRAMUSCULAR; INTRAVENOUS
Status: CANCELLED | OUTPATIENT
Start: 2023-12-22

## 2023-12-22 RX ORDER — HYDROXYZINE HYDROCHLORIDE 10 MG/1
10 TABLET, FILM COATED ORAL EVERY 8 HOURS PRN
Status: CANCELLED | OUTPATIENT
Start: 2023-12-22

## 2023-12-22 RX ORDER — MAGNESIUM HYDROXIDE/ALUMINUM HYDROXICE/SIMETHICONE 120; 1200; 1200 MG/30ML; MG/30ML; MG/30ML
15 SUSPENSION ORAL EVERY 6 HOURS PRN
Status: CANCELLED | OUTPATIENT
Start: 2023-12-22

## 2023-12-22 RX ORDER — SODIUM CHLORIDE 9 MG/ML
INJECTION, SOLUTION INTRAVENOUS CONTINUOUS
Status: CANCELLED | OUTPATIENT
Start: 2023-12-22

## 2023-12-22 RX ORDER — KETOROLAC TROMETHAMINE 30 MG/ML
7.5 INJECTION, SOLUTION INTRAMUSCULAR; INTRAVENOUS EVERY 6 HOURS
Status: CANCELLED | OUTPATIENT
Start: 2023-12-22 | End: 2023-12-23

## 2023-12-22 RX ORDER — OXYCODONE HYDROCHLORIDE 5 MG/1
2.5 TABLET ORAL EVERY 4 HOURS PRN
Status: CANCELLED | OUTPATIENT
Start: 2023-12-22

## 2023-12-22 RX ORDER — ACETAMINOPHEN 325 MG/1
650 TABLET ORAL EVERY 6 HOURS
Status: CANCELLED | OUTPATIENT
Start: 2023-12-22

## 2023-12-22 RX ORDER — SODIUM CHLORIDE 0.9 % (FLUSH) 0.9 %
5-40 SYRINGE (ML) INJECTION PRN
Status: CANCELLED | OUTPATIENT
Start: 2023-12-22

## 2023-12-22 RX ORDER — CYCLOBENZAPRINE HCL 10 MG
10 TABLET ORAL 3 TIMES DAILY PRN
Status: CANCELLED | OUTPATIENT
Start: 2023-12-22

## 2023-12-22 RX ORDER — ONDANSETRON 2 MG/ML
4 INJECTION INTRAMUSCULAR; INTRAVENOUS EVERY 6 HOURS PRN
Status: CANCELLED | OUTPATIENT
Start: 2023-12-22

## 2023-12-22 NOTE — DISCHARGE INSTRUCTIONS
Total Knee Replacement  Discharge Instructions    To prevent Clot formation, you have been placed on the following medication:  ASA for 30 days started on  Surgical Site Care:  . Change dressing once a day and PRN (as needed). Apply 4 x 4 sponge and light tape. If glue present, leave open to air. You may leave wound open to air after initial dressing removal, if wound is clean, dry and intact  If Aquacel Ag dressing is present, do not remove dressing for 7 days, unless heavily saturated. If heavily saturated, remove dressing and start using instructions above  Staples will be removed on post-operative day 14 and steri-strips applied  Showering is permitted starting POD1 if waterproof Aquacel dressing is present or when the incision is covered with 4 x 4 and Tegaderm waterproof dressing  Until all areas of incision are healed. Physical Therapy:  Weight Bearing Status:  WBAT  Precautions, Per Physical Therapy Handout  Pain Medications  You were given narcotic and muscle relaxer   Wean off pain medications as you deem appropriate as long as pain is under control  Cold packs/Ice packs/Machine  May be used 3 times daily for 15-30 minutes as necessary  Be sure to have a barrier (cloth, clothing, towel) between the site and the ice pack to prevent [facility] office if  Increased redness, swelling, drainage of any kind, and/or pain to surgery site. As well as new onset fevers and or chills. These could signify an infection. Calf or thigh tenderness to touch as well as increased swelling or redness. This could signify a clot formation. Numbness or tingling to an area around the incision site or below the incision site (toes). Any rash appears, increased  or new onset nausea/vomiting occur. This may indicate a reaction to a medication. Phone # [phone].   Follow up with Surgeon, Dr Melissa Rosario   I acknowledge that I have received elly hose and understand the instructions on how and

## 2023-12-26 ENCOUNTER — HOSPITAL ENCOUNTER (OUTPATIENT)
Age: 58
Setting detail: OBSERVATION
Discharge: HOME HEALTH | End: 2023-12-27
Attending: ORTHOPAEDIC SURGERY | Admitting: ORTHOPAEDIC SURGERY
Payer: COMMERCIAL

## 2023-12-26 ENCOUNTER — ANESTHESIA (OUTPATIENT)
Dept: OPERATING ROOM | Age: 58
End: 2023-12-26
Payer: COMMERCIAL

## 2023-12-26 ENCOUNTER — APPOINTMENT (OUTPATIENT)
Dept: GENERAL RADIOLOGY | Age: 58
End: 2023-12-26
Attending: ORTHOPAEDIC SURGERY
Payer: COMMERCIAL

## 2023-12-26 DIAGNOSIS — Z96.651 STATUS POST TOTAL KNEE REPLACEMENT, RIGHT: Primary | ICD-10-CM

## 2023-12-26 PROCEDURE — 27447 TOTAL KNEE ARTHROPLASTY: CPT | Performed by: ORTHOPAEDIC SURGERY

## 2023-12-26 PROCEDURE — G0378 HOSPITAL OBSERVATION PER HR: HCPCS

## 2023-12-26 PROCEDURE — 97162 PT EVAL MOD COMPLEX 30 MIN: CPT

## 2023-12-26 PROCEDURE — 7100000000 HC PACU RECOVERY - FIRST 15 MIN: Performed by: ORTHOPAEDIC SURGERY

## 2023-12-26 PROCEDURE — 97166 OT EVAL MOD COMPLEX 45 MIN: CPT

## 2023-12-26 PROCEDURE — 6360000002 HC RX W HCPCS: Performed by: ORTHOPAEDIC SURGERY

## 2023-12-26 PROCEDURE — C1776 JOINT DEVICE (IMPLANTABLE): HCPCS | Performed by: ORTHOPAEDIC SURGERY

## 2023-12-26 PROCEDURE — 7100000001 HC PACU RECOVERY - ADDTL 15 MIN: Performed by: ORTHOPAEDIC SURGERY

## 2023-12-26 PROCEDURE — 3700000000 HC ANESTHESIA ATTENDED CARE: Performed by: ORTHOPAEDIC SURGERY

## 2023-12-26 PROCEDURE — 73560 X-RAY EXAM OF KNEE 1 OR 2: CPT

## 2023-12-26 PROCEDURE — 3700000001 HC ADD 15 MINUTES (ANESTHESIA): Performed by: ORTHOPAEDIC SURGERY

## 2023-12-26 PROCEDURE — 64447 NJX AA&/STRD FEMORAL NRV IMG: CPT | Performed by: ANESTHESIOLOGY

## 2023-12-26 PROCEDURE — 2580000003 HC RX 258: Performed by: ORTHOPAEDIC SURGERY

## 2023-12-26 PROCEDURE — 6370000000 HC RX 637 (ALT 250 FOR IP): Performed by: NURSE PRACTITIONER

## 2023-12-26 PROCEDURE — 3600000014 HC SURGERY LEVEL 4 ADDTL 15MIN: Performed by: ORTHOPAEDIC SURGERY

## 2023-12-26 PROCEDURE — 2709999900 HC NON-CHARGEABLE SUPPLY: Performed by: ORTHOPAEDIC SURGERY

## 2023-12-26 PROCEDURE — 6360000002 HC RX W HCPCS

## 2023-12-26 PROCEDURE — C1713 ANCHOR/SCREW BN/BN,TIS/BN: HCPCS | Performed by: ORTHOPAEDIC SURGERY

## 2023-12-26 PROCEDURE — 2500000003 HC RX 250 WO HCPCS

## 2023-12-26 PROCEDURE — 20985 CPTR-ASST DIR MS PX: CPT | Performed by: ORTHOPAEDIC SURGERY

## 2023-12-26 PROCEDURE — A4217 STERILE WATER/SALINE, 500 ML: HCPCS | Performed by: ORTHOPAEDIC SURGERY

## 2023-12-26 PROCEDURE — 2580000003 HC RX 258

## 2023-12-26 PROCEDURE — 3600000004 HC SURGERY LEVEL 4 BASE: Performed by: ORTHOPAEDIC SURGERY

## 2023-12-26 PROCEDURE — 6360000002 HC RX W HCPCS: Performed by: ANESTHESIOLOGY

## 2023-12-26 PROCEDURE — 2580000003 HC RX 258: Performed by: NURSE PRACTITIONER

## 2023-12-26 PROCEDURE — 2720000010 HC SURG SUPPLY STERILE: Performed by: ORTHOPAEDIC SURGERY

## 2023-12-26 PROCEDURE — A4216 STERILE WATER/SALINE, 10 ML: HCPCS | Performed by: ORTHOPAEDIC SURGERY

## 2023-12-26 PROCEDURE — 27447 TOTAL KNEE ARTHROPLASTY: CPT | Performed by: PHYSICIAN ASSISTANT

## 2023-12-26 PROCEDURE — 94150 VITAL CAPACITY TEST: CPT

## 2023-12-26 PROCEDURE — 6360000002 HC RX W HCPCS: Performed by: NURSE PRACTITIONER

## 2023-12-26 DEVICE — CEMENT BNE 40GM HI VISC RADPQ FOR REV SURG: Type: IMPLANTABLE DEVICE | Site: KNEE | Status: FUNCTIONAL

## 2023-12-26 DEVICE — IMPLANTABLE DEVICE
Type: IMPLANTABLE DEVICE | Site: KNEE | Status: FUNCTIONAL
Brand: PERSONA®

## 2023-12-26 DEVICE — IMPL KNEE PERSONA POLY PATELLA 26MM: Type: IMPLANTABLE DEVICE | Site: KNEE | Status: FUNCTIONAL

## 2023-12-26 DEVICE — SYSTEM TOT KNEE: Type: IMPLANTABLE DEVICE | Site: KNEE | Status: FUNCTIONAL

## 2023-12-26 DEVICE — COMPONENT FEM SZ 7 R KNEE CO CHROM NAR POST STBL CEM: Type: IMPLANTABLE DEVICE | Site: KNEE | Status: FUNCTIONAL

## 2023-12-26 DEVICE — IMPL KNEE ASF FB PSN CPS 12MM VE R6-9CD: Type: IMPLANTABLE DEVICE | Site: KNEE | Status: FUNCTIONAL

## 2023-12-26 DEVICE — DUP USE 333576 IMPL KNEE PSN TIB STM 5 DEG SZ D R: Type: IMPLANTABLE DEVICE | Site: KNEE | Status: FUNCTIONAL

## 2023-12-26 RX ORDER — TRANEXAMIC ACID 650 MG/1
1950 TABLET ORAL EVERY 8 HOURS
Status: COMPLETED | OUTPATIENT
Start: 2023-12-26 | End: 2023-12-26

## 2023-12-26 RX ORDER — ONDANSETRON 2 MG/ML
INJECTION INTRAMUSCULAR; INTRAVENOUS PRN
Status: DISCONTINUED | OUTPATIENT
Start: 2023-12-26 | End: 2023-12-26 | Stop reason: SDUPTHER

## 2023-12-26 RX ORDER — LIDOCAINE HYDROCHLORIDE 10 MG/ML
INJECTION, SOLUTION EPIDURAL; INFILTRATION; INTRACAUDAL; PERINEURAL PRN
Status: DISCONTINUED | OUTPATIENT
Start: 2023-12-26 | End: 2023-12-26 | Stop reason: SDUPTHER

## 2023-12-26 RX ORDER — MAGNESIUM HYDROXIDE 1200 MG/15ML
LIQUID ORAL CONTINUOUS PRN
Status: COMPLETED | OUTPATIENT
Start: 2023-12-26 | End: 2023-12-26

## 2023-12-26 RX ORDER — HYDROXYZINE HYDROCHLORIDE 10 MG/1
10 TABLET, FILM COATED ORAL EVERY 8 HOURS PRN
Status: DISCONTINUED | OUTPATIENT
Start: 2023-12-26 | End: 2023-12-27 | Stop reason: HOSPADM

## 2023-12-26 RX ORDER — SODIUM CHLORIDE 9 MG/ML
INJECTION, SOLUTION INTRAVENOUS PRN
Status: DISCONTINUED | OUTPATIENT
Start: 2023-12-26 | End: 2023-12-27 | Stop reason: HOSPADM

## 2023-12-26 RX ORDER — OXYCODONE HYDROCHLORIDE 5 MG/1
5 TABLET ORAL EVERY 4 HOURS PRN
Status: DISCONTINUED | OUTPATIENT
Start: 2023-12-26 | End: 2023-12-26 | Stop reason: CLARIF

## 2023-12-26 RX ORDER — PROPOFOL 10 MG/ML
INJECTION, EMULSION INTRAVENOUS CONTINUOUS PRN
Status: DISCONTINUED | OUTPATIENT
Start: 2023-12-26 | End: 2023-12-26 | Stop reason: SDUPTHER

## 2023-12-26 RX ORDER — SENNA AND DOCUSATE SODIUM 50; 8.6 MG/1; MG/1
1 TABLET, FILM COATED ORAL 2 TIMES DAILY
Status: DISCONTINUED | OUTPATIENT
Start: 2023-12-26 | End: 2023-12-27 | Stop reason: HOSPADM

## 2023-12-26 RX ORDER — OXYCODONE HYDROCHLORIDE 5 MG/1
2.5 TABLET ORAL EVERY 4 HOURS PRN
Status: DISCONTINUED | OUTPATIENT
Start: 2023-12-26 | End: 2023-12-26 | Stop reason: CLARIF

## 2023-12-26 RX ORDER — MIDAZOLAM HYDROCHLORIDE 1 MG/ML
INJECTION INTRAMUSCULAR; INTRAVENOUS PRN
Status: DISCONTINUED | OUTPATIENT
Start: 2023-12-26 | End: 2023-12-26 | Stop reason: SDUPTHER

## 2023-12-26 RX ORDER — ONDANSETRON 4 MG/1
4 TABLET, ORALLY DISINTEGRATING ORAL EVERY 8 HOURS PRN
Status: DISCONTINUED | OUTPATIENT
Start: 2023-12-26 | End: 2023-12-27 | Stop reason: HOSPADM

## 2023-12-26 RX ORDER — CELECOXIB 200 MG/1
200 CAPSULE ORAL ONCE
Status: COMPLETED | OUTPATIENT
Start: 2023-12-26 | End: 2023-12-26

## 2023-12-26 RX ORDER — OXYCODONE HYDROCHLORIDE 5 MG/1
5 TABLET ORAL
Status: DISCONTINUED | OUTPATIENT
Start: 2023-12-26 | End: 2023-12-26 | Stop reason: HOSPADM

## 2023-12-26 RX ORDER — SODIUM CHLORIDE 0.9 % (FLUSH) 0.9 %
5-40 SYRINGE (ML) INJECTION EVERY 12 HOURS SCHEDULED
Status: DISCONTINUED | OUTPATIENT
Start: 2023-12-26 | End: 2023-12-27 | Stop reason: HOSPADM

## 2023-12-26 RX ORDER — SODIUM CHLORIDE 0.9 % (FLUSH) 0.9 %
5-40 SYRINGE (ML) INJECTION PRN
Status: DISCONTINUED | OUTPATIENT
Start: 2023-12-26 | End: 2023-12-27 | Stop reason: HOSPADM

## 2023-12-26 RX ORDER — ASPIRIN 81 MG/1
81 TABLET ORAL 2 TIMES DAILY
Status: DISCONTINUED | OUTPATIENT
Start: 2023-12-26 | End: 2023-12-27 | Stop reason: HOSPADM

## 2023-12-26 RX ORDER — TRANEXAMIC ACID 650 MG/1
1950 TABLET ORAL
Status: DISCONTINUED | OUTPATIENT
Start: 2023-12-26 | End: 2023-12-26 | Stop reason: HOSPADM

## 2023-12-26 RX ORDER — ROPIVACAINE HYDROCHLORIDE 5 MG/ML
INJECTION, SOLUTION EPIDURAL; INFILTRATION; PERINEURAL PRN
Status: DISCONTINUED | OUTPATIENT
Start: 2023-12-26 | End: 2023-12-26 | Stop reason: SDUPTHER

## 2023-12-26 RX ORDER — MORPHINE SULFATE 2 MG/ML
2 INJECTION, SOLUTION INTRAMUSCULAR; INTRAVENOUS
Status: DISCONTINUED | OUTPATIENT
Start: 2023-12-26 | End: 2023-12-27 | Stop reason: HOSPADM

## 2023-12-26 RX ORDER — OXYCODONE HYDROCHLORIDE 5 MG/1
5 CAPSULE ORAL EVERY 4 HOURS PRN
Status: DISCONTINUED | OUTPATIENT
Start: 2023-12-26 | End: 2023-12-27 | Stop reason: HOSPADM

## 2023-12-26 RX ORDER — METOCLOPRAMIDE HYDROCHLORIDE 5 MG/ML
10 INJECTION INTRAMUSCULAR; INTRAVENOUS
Status: DISCONTINUED | OUTPATIENT
Start: 2023-12-26 | End: 2023-12-26 | Stop reason: HOSPADM

## 2023-12-26 RX ORDER — BUPIVACAINE HYDROCHLORIDE 7.5 MG/ML
INJECTION, SOLUTION INTRASPINAL
Status: COMPLETED | OUTPATIENT
Start: 2023-12-26 | End: 2023-12-26

## 2023-12-26 RX ORDER — FENTANYL CITRATE 0.05 MG/ML
50 INJECTION, SOLUTION INTRAMUSCULAR; INTRAVENOUS EVERY 10 MIN PRN
Status: DISCONTINUED | OUTPATIENT
Start: 2023-12-26 | End: 2023-12-26 | Stop reason: HOSPADM

## 2023-12-26 RX ORDER — ONDANSETRON 2 MG/ML
4 INJECTION INTRAMUSCULAR; INTRAVENOUS
Status: DISCONTINUED | OUTPATIENT
Start: 2023-12-26 | End: 2023-12-26 | Stop reason: HOSPADM

## 2023-12-26 RX ORDER — CYCLOBENZAPRINE HCL 10 MG
10 TABLET ORAL 3 TIMES DAILY PRN
Status: DISCONTINUED | OUTPATIENT
Start: 2023-12-26 | End: 2023-12-27 | Stop reason: HOSPADM

## 2023-12-26 RX ORDER — SODIUM CHLORIDE 0.9 % (FLUSH) 0.9 %
5-40 SYRINGE (ML) INJECTION PRN
Status: DISCONTINUED | OUTPATIENT
Start: 2023-12-26 | End: 2023-12-26 | Stop reason: HOSPADM

## 2023-12-26 RX ORDER — ACETAMINOPHEN 325 MG/1
650 TABLET ORAL EVERY 6 HOURS
Status: DISCONTINUED | OUTPATIENT
Start: 2023-12-26 | End: 2023-12-27 | Stop reason: HOSPADM

## 2023-12-26 RX ORDER — ONDANSETRON 2 MG/ML
4 INJECTION INTRAMUSCULAR; INTRAVENOUS EVERY 6 HOURS PRN
Status: DISCONTINUED | OUTPATIENT
Start: 2023-12-26 | End: 2023-12-27 | Stop reason: HOSPADM

## 2023-12-26 RX ORDER — ACETAMINOPHEN 500 MG
1000 TABLET ORAL ONCE
Status: COMPLETED | OUTPATIENT
Start: 2023-12-26 | End: 2023-12-26

## 2023-12-26 RX ORDER — SODIUM CHLORIDE 9 MG/ML
INJECTION, SOLUTION INTRAVENOUS CONTINUOUS
Status: DISCONTINUED | OUTPATIENT
Start: 2023-12-26 | End: 2023-12-27 | Stop reason: HOSPADM

## 2023-12-26 RX ORDER — TRANEXAMIC ACID 650 MG/1
1950 TABLET ORAL ONCE
Status: COMPLETED | OUTPATIENT
Start: 2023-12-26 | End: 2023-12-27

## 2023-12-26 RX ORDER — DIPHENHYDRAMINE HYDROCHLORIDE 50 MG/ML
12.5 INJECTION INTRAMUSCULAR; INTRAVENOUS
Status: DISCONTINUED | OUTPATIENT
Start: 2023-12-26 | End: 2023-12-26 | Stop reason: HOSPADM

## 2023-12-26 RX ORDER — MAGNESIUM HYDROXIDE 1200 MG/15ML
LIQUID ORAL PRN
Status: DISCONTINUED | OUTPATIENT
Start: 2023-12-26 | End: 2023-12-26 | Stop reason: ALTCHOICE

## 2023-12-26 RX ORDER — SODIUM CHLORIDE 0.9 % (FLUSH) 0.9 %
5-40 SYRINGE (ML) INJECTION EVERY 12 HOURS SCHEDULED
Status: DISCONTINUED | OUTPATIENT
Start: 2023-12-26 | End: 2023-12-26 | Stop reason: HOSPADM

## 2023-12-26 RX ORDER — KETOROLAC TROMETHAMINE 15 MG/ML
7.5 INJECTION, SOLUTION INTRAMUSCULAR; INTRAVENOUS EVERY 6 HOURS
Status: COMPLETED | OUTPATIENT
Start: 2023-12-26 | End: 2023-12-27

## 2023-12-26 RX ORDER — SODIUM CHLORIDE, SODIUM LACTATE, POTASSIUM CHLORIDE, CALCIUM CHLORIDE 600; 310; 30; 20 MG/100ML; MG/100ML; MG/100ML; MG/100ML
INJECTION, SOLUTION INTRAVENOUS CONTINUOUS PRN
Status: DISCONTINUED | OUTPATIENT
Start: 2023-12-26 | End: 2023-12-26 | Stop reason: SDUPTHER

## 2023-12-26 RX ORDER — MEPERIDINE HYDROCHLORIDE 25 MG/ML
12.5 INJECTION INTRAMUSCULAR; INTRAVENOUS; SUBCUTANEOUS
Status: DISCONTINUED | OUTPATIENT
Start: 2023-12-26 | End: 2023-12-26 | Stop reason: HOSPADM

## 2023-12-26 RX ORDER — MAGNESIUM HYDROXIDE/ALUMINUM HYDROXICE/SIMETHICONE 120; 1200; 1200 MG/30ML; MG/30ML; MG/30ML
15 SUSPENSION ORAL EVERY 6 HOURS PRN
Status: DISCONTINUED | OUTPATIENT
Start: 2023-12-26 | End: 2023-12-27 | Stop reason: HOSPADM

## 2023-12-26 RX ORDER — DEXAMETHASONE SODIUM PHOSPHATE 4 MG/ML
INJECTION, SOLUTION INTRA-ARTICULAR; INTRALESIONAL; INTRAMUSCULAR; INTRAVENOUS; SOFT TISSUE PRN
Status: DISCONTINUED | OUTPATIENT
Start: 2023-12-26 | End: 2023-12-26 | Stop reason: SDUPTHER

## 2023-12-26 RX ORDER — OXYCODONE HCL 10 MG/1
10 TABLET, FILM COATED, EXTENDED RELEASE ORAL ONCE
Status: COMPLETED | OUTPATIENT
Start: 2023-12-26 | End: 2023-12-26

## 2023-12-26 RX ORDER — OXYCODONE HYDROCHLORIDE 5 MG/1
2.5 TABLET ORAL EVERY 4 HOURS PRN
Status: DISCONTINUED | OUTPATIENT
Start: 2023-12-26 | End: 2023-12-27 | Stop reason: HOSPADM

## 2023-12-26 RX ORDER — SODIUM CHLORIDE 9 MG/ML
INJECTION, SOLUTION INTRAVENOUS PRN
Status: DISCONTINUED | OUTPATIENT
Start: 2023-12-26 | End: 2023-12-26 | Stop reason: HOSPADM

## 2023-12-26 RX ADMIN — KETOROLAC TROMETHAMINE 7.5 MG: 15 INJECTION, SOLUTION INTRAMUSCULAR; INTRAVENOUS at 21:22

## 2023-12-26 RX ADMIN — CELECOXIB 200 MG: 200 CAPSULE ORAL at 07:44

## 2023-12-26 RX ADMIN — BUPIVACAINE HYDROCHLORIDE IN DEXTROSE 15 MG: 7.5 INJECTION, SOLUTION SUBARACHNOID at 09:10

## 2023-12-26 RX ADMIN — ROPIVACAINE HYDROCHLORIDE 30 ML: 5 INJECTION, SOLUTION EPIDURAL; INFILTRATION; PERINEURAL at 09:02

## 2023-12-26 RX ADMIN — ACETAMINOPHEN 650 MG: 325 TABLET ORAL at 16:38

## 2023-12-26 RX ADMIN — LIDOCAINE HYDROCHLORIDE 50 MG: 10 INJECTION, SOLUTION EPIDURAL; INFILTRATION; INTRACAUDAL; PERINEURAL at 09:23

## 2023-12-26 RX ADMIN — ACETAMINOPHEN 650 MG: 325 TABLET ORAL at 21:22

## 2023-12-26 RX ADMIN — OXYCODONE HYDROCHLORIDE 5 MG: 5 TABLET ORAL at 14:54

## 2023-12-26 RX ADMIN — CEFAZOLIN 2000 MG: 2 INJECTION, POWDER, FOR SOLUTION INTRAMUSCULAR; INTRAVENOUS at 09:23

## 2023-12-26 RX ADMIN — CEFAZOLIN 2000 MG: 2 INJECTION, POWDER, FOR SOLUTION INTRAMUSCULAR; INTRAVENOUS at 17:01

## 2023-12-26 RX ADMIN — OXYCODONE HYDROCHLORIDE 10 MG: 10 TABLET, FILM COATED, EXTENDED RELEASE ORAL at 07:45

## 2023-12-26 RX ADMIN — SODIUM CHLORIDE, POTASSIUM CHLORIDE, SODIUM LACTATE AND CALCIUM CHLORIDE: 600; 310; 30; 20 INJECTION, SOLUTION INTRAVENOUS at 09:59

## 2023-12-26 RX ADMIN — SENNOSIDES AND DOCUSATE SODIUM 1 TABLET: 50; 8.6 TABLET ORAL at 16:39

## 2023-12-26 RX ADMIN — TRANEXAMIC ACID 1950 MG: 650 TABLET ORAL at 16:49

## 2023-12-26 RX ADMIN — SODIUM CHLORIDE: 9 INJECTION, SOLUTION INTRAVENOUS at 16:52

## 2023-12-26 RX ADMIN — ACETAMINOPHEN 1000 MG: 500 TABLET ORAL at 07:44

## 2023-12-26 RX ADMIN — PROPOFOL 100 MCG/KG/MIN: 10 INJECTION, EMULSION INTRAVENOUS at 09:21

## 2023-12-26 RX ADMIN — ASPIRIN 81 MG: 81 TABLET, COATED ORAL at 16:39

## 2023-12-26 RX ADMIN — PHENYLEPHRINE HYDROCHLORIDE 50 MCG: 10 INJECTION INTRAVENOUS at 10:03

## 2023-12-26 RX ADMIN — MIDAZOLAM HYDROCHLORIDE 2 MG: 1 INJECTION, SOLUTION INTRAMUSCULAR; INTRAVENOUS at 09:00

## 2023-12-26 RX ADMIN — PHENYLEPHRINE HYDROCHLORIDE 100 MCG: 10 INJECTION INTRAVENOUS at 10:40

## 2023-12-26 RX ADMIN — TRANEXAMIC ACID 1950 MG: 650 TABLET ORAL at 07:41

## 2023-12-26 RX ADMIN — SODIUM CHLORIDE, POTASSIUM CHLORIDE, SODIUM LACTATE AND CALCIUM CHLORIDE: 600; 310; 30; 20 INJECTION, SOLUTION INTRAVENOUS at 09:17

## 2023-12-26 RX ADMIN — DEXAMETHASONE SODIUM PHOSPHATE 4 MG: 4 INJECTION INTRA-ARTICULAR; INTRALESIONAL; INTRAMUSCULAR; INTRAVENOUS; SOFT TISSUE at 09:29

## 2023-12-26 RX ADMIN — PROPOFOL 60 MG: 10 INJECTION, EMULSION INTRAVENOUS at 09:22

## 2023-12-26 RX ADMIN — ASPIRIN 81 MG: 81 TABLET, COATED ORAL at 21:22

## 2023-12-26 RX ADMIN — OXYCODONE HYDROCHLORIDE 5 MG: 5 CAPSULE ORAL at 19:08

## 2023-12-26 RX ADMIN — SENNOSIDES AND DOCUSATE SODIUM 1 TABLET: 50; 8.6 TABLET ORAL at 21:22

## 2023-12-26 RX ADMIN — ONDANSETRON 4 MG: 2 INJECTION INTRAMUSCULAR; INTRAVENOUS at 09:29

## 2023-12-26 RX ADMIN — KETOROLAC TROMETHAMINE 7.5 MG: 15 INJECTION, SOLUTION INTRAMUSCULAR; INTRAVENOUS at 16:40

## 2023-12-26 RX ADMIN — SODIUM CHLORIDE, PRESERVATIVE FREE 10 ML: 5 INJECTION INTRAVENOUS at 21:22

## 2023-12-26 NOTE — PLAN OF CARE
See OT evaluation for all goals and OT POC.  Electronically signed by ELIJAH Tran/L on 12/26/2023 at 4:22 PM

## 2023-12-26 NOTE — ANESTHESIA POSTPROCEDURE EVALUATION
Department of Anesthesiology  Postprocedure Note    Patient: Linda Knowles  MRN: [id number]  YOB: 1965  Date of evaluation: 12/26/2023    Procedure Summary       Date: 12/26/23 Room / Location: [address]    Anesthesia Start: 6466 Anesthesia Stop: 1108    Procedure: RIGHT KNEE TOTAL  KNEE ARTHROPLASTY, SUPINE, DAYNA PSI. SARITA (Right) Diagnosis:       Primary osteoarthritis of right knee      (Primary osteoarthritis of right knee [M17.11])    Surgeons: Emelia Santiago MD Responsible Provider: Flaca Leon MD    Anesthesia Type: spinal ASA Status: 2            Anesthesia Type: No value filed. Dominguez Phase I: Dominguez Score: 10    Dominguez Phase II:      Anesthesia Post Evaluation    Patient location during evaluation: bedside  Patient participation: complete - patient participated  Level of consciousness: awake and awake and alert  Airway patency: patent  Nausea & Vomiting: no nausea and no vomiting  Cardiovascular status: blood pressure returned to baseline and hemodynamically stable  Respiratory status: acceptable  Hydration status: euvolemic  Pain management: adequate        No notable events documented.

## 2023-12-26 NOTE — ANESTHESIA PROCEDURE NOTES
Spinal Block    Patient location during procedure: pre-op  Reason for block: primary anesthetic  Staffing  Performed: anesthesiologist   Anesthesiologist: Germaine Beth MD  Performed by: Germaine Beth MD  Authorized by: Germaine Beth MD    Spinal Block  Patient position: sitting  Prep: Betadine  Patient monitoring: cardiac monitor, continuous pulse ox and frequent blood pressure checks  Approach: midline  Location: L4/L5  Provider prep: mask and sterile gloves  Local infiltration: lidocaine  Needle  Needle type: Pencan   Needle gauge: 24 G  Assessment  Events: cerebrospinal fluid  Swirl obtained: Yes  CSF: clear  Attempts: 2  Hemodynamics: unstable  Preanesthetic Checklist  Completed: patient identified, IV checked, site marked, risks and benefits discussed, surgical/procedural consents, equipment checked, pre-op evaluation, timeout performed, anesthesia consent given, oxygen available, monitors applied/VS acknowledged, fire risk safety assessment completed and verbalized and blood product R/B/A discussed and consented

## 2023-12-26 NOTE — OP NOTE
Operative Note      Patient: Linda Knowles  YOB: 1965  MRN: [id number]    Date of Procedure: 12/26/2023    Pre-Op Diagnosis Codes:     * Primary osteoarthritis of right knee [M17.11]    Post-Op Diagnosis: Same       Procedure:  Right total knee arthroplasty using a Dayna size 7 cemented persona narrow posterior stabilized femoral component, size D cemented persona tibial tray with 30 mm stem extension, 12 mm constrained posterior stabilized polyethylene spacer and 26 mm persona all poly patellar inset button  Utilization of the Dayna Butlr computer alignment system    Surgeon(s):  Emelia Santiago MD    Assistant:   Physician Assistant: Kyaw Storm PA-C    Anesthesia: Spinal    Estimated Blood Loss (mL): Minimal    Complications: None    Specimens:   * No specimens in log *    Implants:  Implant Name Type Inv.  Item Serial No.  Lot No. LRB No. Used Action   CEMENT BNE 40GM HI VISC RADPQ FOR REV SURG - [id number]  CEMENT BNE 40GM HI VISC RADPQ FOR REV SURG  [employer]- [id number] Right 1 Implanted   CEMENT BNE 40GM HI VISC RADPQ FOR REV SURG - [id number]  CEMENT BNE 40GM HI VISC RADPQ FOR REV SURG  [employer]- [id number] Right 1 Implanted   EXTENSION STEM L30MM [id number] KNEE [employer] PERSONA - [id number]  EXTENSION STEM L30MM [id number] KNEE TAPR CHRIST PERSONA  [employer]- [id number] Right 1 Implanted   IMPL KNEE ASF FB PSN CPS 12MM VE R6-9CD - [id number] Knee IMPL KNEE ASF FB PSN CPS 12MM VE R6-9CD  DAYNA [employer]-PMM [id number] Right 1 Implanted   IMPL KNEE PERSONA POLY PATELLA 26MM - [id number] Knee IMPL KNEE PERSONA POLY PATELLA 26MM  [employer]-PMM [id number] Right 1 Implanted   DUP USE [id number] IMPL KNEE PSN TIB STM 5 DEG SZ D R - [id number] Knee DUP USE [id number] IMPL KNEE PSN TIB STM 5 DEG SZ D R  [employer]PMM [id number] Right 1 Implanted   COMPONENT FEM SZ 7 R KNEE CO CHROM DIANNE POST STBL CHRIST - [id number]  COMPONENT FEM SZ 7 R KNEE CO CHROM

## 2023-12-26 NOTE — ANESTHESIA PROCEDURE NOTES
Peripheral Block    Patient location during procedure: pre-op  Reason for block: post-op pain management  Start time: 12/26/2023 9:00 AM  Staffing  Performed: anesthesiologist   Anesthesiologist: Shira Fam MD  Performed by: Shira Fam MD  Authorized by: Shira Fam MD    Preanesthetic Checklist  Completed: patient identified, IV checked, site marked, risks and benefits discussed, surgical/procedural consents, equipment checked, pre-op evaluation, timeout performed, anesthesia consent given, oxygen available, monitors applied/VS acknowledged, fire risk safety assessment completed and verbalized and blood product R/B/A discussed and consented  Peripheral Block   Patient position: supine  Prep: ChloraPrep  Provider prep: mask and sterile gloves  Patient monitoring: cardiac monitor, continuous pulse ox, frequent blood pressure checks and IV access  Block type: Saphenous  Laterality: right  Injection technique: single-shot  Guidance: ultrasound guided  Local infiltration: ropivacaine  Infiltration strength: 0.5 %  Local infiltration: ropivacaine  Dose: 30 mL    Needle   Needle type: insulated echogenic nerve stimulator needle   Needle gauge: 21 G  Needle localization: ultrasound guidance  Test dose: negative  Needle length: 10 cm  Assessment   Injection assessment: negative aspiration for heme, no paresthesia on injection, local visualized surrounding nerve on ultrasound and no intravascular symptoms  Paresthesia pain: none  Slow fractionated injection: yes  Hemodynamics: stable  Real-time US image taken/store: yes  Outcomes: uncomplicated and patient tolerated procedure well

## 2023-12-27 VITALS
RESPIRATION RATE: 18 BRPM | SYSTOLIC BLOOD PRESSURE: 101 MMHG | DIASTOLIC BLOOD PRESSURE: 67 MMHG | TEMPERATURE: 98.2 F | WEIGHT: 200 LBS | OXYGEN SATURATION: 94 % | BODY MASS INDEX: 35.44 KG/M2 | HEIGHT: 63 IN | HEART RATE: 69 BPM

## 2023-12-27 LAB
ANION GAP SERPL CALCULATED.3IONS-SCNC: 9 MEQ/L (ref 9–15)
BUN SERPL-MCNC: 10 MG/DL (ref 6–20)
CALCIUM SERPL-MCNC: 8.2 MG/DL (ref 8.5–9.9)
CHLORIDE SERPL-SCNC: 104 MEQ/L (ref 95–107)
CO2 SERPL-SCNC: 24 MEQ/L (ref 20–31)
CREAT SERPL-MCNC: 0.56 MG/DL (ref 0.5–0.9)
ERYTHROCYTE [DISTWIDTH] IN BLOOD BY AUTOMATED COUNT: 13.7 % (ref 11.5–14.5)
GLUCOSE SERPL-MCNC: 117 MG/DL (ref 70–99)
HCT VFR BLD AUTO: 31.8 % (ref 37–47)
HGB BLD-MCNC: 10.6 G/DL (ref 12–16)
MCH RBC QN AUTO: 31.8 PG (ref 27–31.3)
MCHC RBC AUTO-ENTMCNC: 33.3 % (ref 33–37)
MCV RBC AUTO: 95.5 FL (ref 79.4–94.8)
PLATELET # BLD AUTO: 239 K/UL (ref 130–400)
POTASSIUM SERPL-SCNC: 3.8 MEQ/L (ref 3.4–4.9)
RBC # BLD AUTO: 3.33 M/UL (ref 4.2–5.4)
SODIUM SERPL-SCNC: 137 MEQ/L (ref 135–144)
WBC # BLD AUTO: 6.7 K/UL (ref 4.8–10.8)

## 2023-12-27 PROCEDURE — 80048 BASIC METABOLIC PNL TOTAL CA: CPT

## 2023-12-27 PROCEDURE — G0378 HOSPITAL OBSERVATION PER HR: HCPCS

## 2023-12-27 PROCEDURE — 97116 GAIT TRAINING THERAPY: CPT

## 2023-12-27 PROCEDURE — 6370000000 HC RX 637 (ALT 250 FOR IP): Performed by: NURSE PRACTITIONER

## 2023-12-27 PROCEDURE — 96376 TX/PRO/DX INJ SAME DRUG ADON: CPT

## 2023-12-27 PROCEDURE — 2580000003 HC RX 258: Performed by: NURSE PRACTITIONER

## 2023-12-27 PROCEDURE — 36415 COLL VENOUS BLD VENIPUNCTURE: CPT

## 2023-12-27 PROCEDURE — 97535 SELF CARE MNGMENT TRAINING: CPT

## 2023-12-27 PROCEDURE — 85027 COMPLETE CBC AUTOMATED: CPT

## 2023-12-27 PROCEDURE — 6360000002 HC RX W HCPCS: Performed by: NURSE PRACTITIONER

## 2023-12-27 PROCEDURE — 96374 THER/PROPH/DIAG INJ IV PUSH: CPT

## 2023-12-27 RX ORDER — CYCLOBENZAPRINE HCL 10 MG
10 TABLET ORAL 3 TIMES DAILY PRN
Qty: 30 TABLET | Refills: 0 | Status: SHIPPED | OUTPATIENT
Start: 2023-12-27 | End: 2024-01-06

## 2023-12-27 RX ORDER — OXYCODONE HYDROCHLORIDE 5 MG/1
5 TABLET ORAL EVERY 4 HOURS PRN
Qty: 40 TABLET | Refills: 0 | Status: SHIPPED | OUTPATIENT
Start: 2023-12-27 | End: 2024-01-03

## 2023-12-27 RX ORDER — FLUOXETINE HYDROCHLORIDE 20 MG/1
40 CAPSULE ORAL DAILY
Status: DISCONTINUED | OUTPATIENT
Start: 2023-12-27 | End: 2023-12-27 | Stop reason: HOSPADM

## 2023-12-27 RX ORDER — ROSUVASTATIN CALCIUM 5 MG/1
20 TABLET, COATED ORAL DAILY
Status: DISCONTINUED | OUTPATIENT
Start: 2023-12-27 | End: 2023-12-27 | Stop reason: HOSPADM

## 2023-12-27 RX ORDER — SENNA AND DOCUSATE SODIUM 50; 8.6 MG/1; MG/1
1 TABLET, FILM COATED ORAL 2 TIMES DAILY
Qty: 60 TABLET | Refills: 0 | Status: SHIPPED | OUTPATIENT
Start: 2023-12-27 | End: 2024-01-26

## 2023-12-27 RX ORDER — HYDROXYZINE HYDROCHLORIDE 10 MG/1
10 TABLET, FILM COATED ORAL EVERY 8 HOURS PRN
Qty: 30 TABLET | Refills: 0 | Status: SHIPPED | OUTPATIENT
Start: 2023-12-27 | End: 2024-01-06

## 2023-12-27 RX ORDER — ASPIRIN 81 MG/1
81 TABLET ORAL 2 TIMES DAILY
Qty: 60 TABLET | Refills: 0 | Status: SHIPPED | OUTPATIENT
Start: 2023-12-27 | End: 2024-01-26

## 2023-12-27 RX ADMIN — ACETAMINOPHEN 650 MG: 325 TABLET ORAL at 00:21

## 2023-12-27 RX ADMIN — SENNOSIDES AND DOCUSATE SODIUM 1 TABLET: 50; 8.6 TABLET ORAL at 08:12

## 2023-12-27 RX ADMIN — SODIUM CHLORIDE, PRESERVATIVE FREE 10 ML: 5 INJECTION INTRAVENOUS at 08:13

## 2023-12-27 RX ADMIN — SODIUM CHLORIDE: 9 INJECTION, SOLUTION INTRAVENOUS at 04:12

## 2023-12-27 RX ADMIN — TRANEXAMIC ACID 1950 MG: 650 TABLET ORAL at 00:21

## 2023-12-27 RX ADMIN — OXYCODONE HYDROCHLORIDE 5 MG: 5 CAPSULE ORAL at 08:13

## 2023-12-27 RX ADMIN — CEFAZOLIN 2000 MG: 2 INJECTION, POWDER, FOR SOLUTION INTRAMUSCULAR; INTRAVENOUS at 00:27

## 2023-12-27 RX ADMIN — CYCLOBENZAPRINE 10 MG: 10 TABLET, FILM COATED ORAL at 08:13

## 2023-12-27 RX ADMIN — KETOROLAC TROMETHAMINE 7.5 MG: 15 INJECTION, SOLUTION INTRAMUSCULAR; INTRAVENOUS at 00:21

## 2023-12-27 RX ADMIN — ASPIRIN 81 MG: 81 TABLET, COATED ORAL at 08:13

## 2023-12-27 RX ADMIN — KETOROLAC TROMETHAMINE 7.5 MG: 15 INJECTION, SOLUTION INTRAMUSCULAR; INTRAVENOUS at 08:13

## 2023-12-27 RX ADMIN — ACETAMINOPHEN 650 MG: 325 TABLET ORAL at 08:12

## 2023-12-27 RX ADMIN — OXYCODONE HYDROCHLORIDE 5 MG: 5 CAPSULE ORAL at 02:31

## 2023-12-27 NOTE — DISCHARGE INSTR - DIET
Good nutrition is important when healing from an illness, injury, or surgery. Follow any nutrition recommendations given to you during your hospital stay. If you were given an oral nutrition supplement while in the hospital, continue to take this supplement at home. You can take it with meals, in-between meals, and/or before bedtime. These supplements can be purchased at most local grocery stores, pharmacies, and chain [employer]-stores. If you have any questions about your diet or nutrition, call the hospital and ask for the dietitian.   GENERAL DIET

## 2024-01-03 ENCOUNTER — TELEPHONE (OUTPATIENT)
Dept: ORTHOPEDIC SURGERY | Facility: CLINIC | Age: 59
End: 2024-01-03
Payer: COMMERCIAL

## 2024-01-03 DIAGNOSIS — G89.18 POST-OPERATIVE PAIN: Primary | ICD-10-CM

## 2024-01-03 RX ORDER — OXYCODONE AND ACETAMINOPHEN 5; 325 MG/1; MG/1
1 TABLET ORAL EVERY 6 HOURS PRN
Qty: 20 TABLET | Refills: 0 | Status: SHIPPED | OUTPATIENT
Start: 2024-01-03 | End: 2024-01-08 | Stop reason: SDUPTHER

## 2024-01-08 ENCOUNTER — OFFICE VISIT (OUTPATIENT)
Dept: ORTHOPEDIC SURGERY | Facility: CLINIC | Age: 59
End: 2024-01-08
Payer: COMMERCIAL

## 2024-01-08 ENCOUNTER — ANCILLARY PROCEDURE (OUTPATIENT)
Dept: RADIOLOGY | Facility: CLINIC | Age: 59
End: 2024-01-08
Payer: COMMERCIAL

## 2024-01-08 DIAGNOSIS — Z96.651 STATUS POST RIGHT KNEE REPLACEMENT: ICD-10-CM

## 2024-01-08 DIAGNOSIS — G89.18 POST-OPERATIVE PAIN: ICD-10-CM

## 2024-01-08 PROCEDURE — 99024 POSTOP FOLLOW-UP VISIT: CPT | Performed by: PHYSICIAN ASSISTANT

## 2024-01-08 PROCEDURE — 73562 X-RAY EXAM OF KNEE 3: CPT | Mod: RT

## 2024-01-08 PROCEDURE — 73562 X-RAY EXAM OF KNEE 3: CPT | Mod: RIGHT SIDE | Performed by: ORTHOPAEDIC SURGERY

## 2024-01-08 PROCEDURE — 1036F TOBACCO NON-USER: CPT | Performed by: PHYSICIAN ASSISTANT

## 2024-01-08 RX ORDER — OXYCODONE AND ACETAMINOPHEN 5; 325 MG/1; MG/1
1 TABLET ORAL EVERY 8 HOURS PRN
Qty: 15 TABLET | Refills: 0 | Status: SHIPPED | OUTPATIENT
Start: 2024-01-08 | End: 2024-01-13

## 2024-01-08 NOTE — PROGRESS NOTES
History: Bri Mays is here today for her first post-op visit.  She is two weeks out from a total knee arthroplasty.  She is having normal post-operative pain.     Physical Exam: The wound is healing nicely. There is no redness, irritation, or drainage.  The patient is able to straight leg raise. Range of motion is from 5 to 80 degrees. Swelling and ecchymosis are normal for this stage of healing.  She is neurovascularly intact throughout the extremity.     Radiographs: AP and lateral views of the right knee show excellent alignment of the total knee arthroplasty with no acute abnormality.     Assessment: Stable right total knee arthroplasty, 2 weeks out.     Plan:   She is going to continue with physical therapy.  Her order was updated today. The goal for next visit is range of motion between 110-120 degrees.  She will continue to ice several times throughout the day.  She will continue to wean down off narcotics.  She is given a small refill.  She can continue with her Celebrex and muscle relaxer as needed.  She will follow up in 4 weeks for a follow up with 2 view knee x-rays.   All questions were answered with the patient.

## 2024-01-09 DIAGNOSIS — G43.909 MIGRAINE WITHOUT STATUS MIGRAINOSUS, NOT INTRACTABLE, UNSPECIFIED MIGRAINE TYPE: ICD-10-CM

## 2024-01-09 DIAGNOSIS — E78.5 DYSLIPIDEMIA: ICD-10-CM

## 2024-01-09 RX ORDER — ROSUVASTATIN CALCIUM 20 MG/1
20 TABLET, COATED ORAL DAILY
Qty: 90 TABLET | Refills: 0 | Status: SHIPPED | OUTPATIENT
Start: 2024-01-09 | End: 2024-04-08

## 2024-01-09 RX ORDER — SUMATRIPTAN SUCCINATE 100 MG/1
TABLET ORAL
Qty: 9 TABLET | Refills: 2 | Status: SHIPPED | OUTPATIENT
Start: 2024-01-09

## 2024-01-16 ENCOUNTER — TELEPHONE (OUTPATIENT)
Dept: ORTHOPEDIC SURGERY | Facility: CLINIC | Age: 59
End: 2024-01-16
Payer: COMMERCIAL

## 2024-01-17 ENCOUNTER — TELEPHONE (OUTPATIENT)
Dept: ORTHOPEDIC SURGERY | Facility: CLINIC | Age: 59
End: 2024-01-17
Payer: COMMERCIAL

## 2024-01-17 NOTE — TELEPHONE ENCOUNTER
Patient had RT TKR on 12/26.  Koby Marley with [facility] called asking if a refill for Percocet could be sent to [facility] on [facility] in [place].

## 2024-01-31 ENCOUNTER — APPOINTMENT (OUTPATIENT)
Dept: PHYSICAL THERAPY | Facility: CLINIC | Age: 59
End: 2024-01-31
Payer: COMMERCIAL

## 2024-02-02 ENCOUNTER — EVALUATION (OUTPATIENT)
Dept: PHYSICAL THERAPY | Facility: CLINIC | Age: 59
End: 2024-02-02
Payer: COMMERCIAL

## 2024-02-02 DIAGNOSIS — M25.561 RIGHT ANTERIOR KNEE PAIN: Primary | ICD-10-CM

## 2024-02-02 DIAGNOSIS — F41.1 GENERALIZED ANXIETY DISORDER: ICD-10-CM

## 2024-02-02 PROCEDURE — 97161 PT EVAL LOW COMPLEX 20 MIN: CPT | Mod: GP

## 2024-02-02 PROCEDURE — 97110 THERAPEUTIC EXERCISES: CPT | Mod: GP

## 2024-02-02 RX ORDER — FLUOXETINE HYDROCHLORIDE 40 MG/1
40 CAPSULE ORAL DAILY
Qty: 90 CAPSULE | Refills: 0 | Status: SHIPPED | OUTPATIENT
Start: 2024-02-02 | End: 2024-05-06

## 2024-02-02 ASSESSMENT — ENCOUNTER SYMPTOMS
LOSS OF SENSATION IN FEET: 0
OCCASIONAL FEELINGS OF UNSTEADINESS: 0
DEPRESSION: 0

## 2024-02-02 NOTE — PROGRESS NOTES
Physical Therapy  Physical Therapy Evaluation    Patient Name: Bri Mays  MRN: [id number]  Today's Date: 2/2/2024       Insurance:  COPAY 0 [phone]) [phone]) COVERAGE 90 OOP [phone]  Visit number: 1  Authorization info: NO AUTH REQ  Insurance Type: MMO SUPER MED     General:  Reason for visit: R TKA  Referred by: CHASE Nair    Current Problem:  No diagnosis found.    Precautions: R TKA     Medical History Form: Reviewed (scanned into chart)    Subjective:     Chief Complaint: Patient presents to clinic s/p R TKA 12/26/2023      Current Condition:   Same    Pain:     Location: R Knee  Description: Pain w/ flexion  Aggravating Factors:  Bending/Straightening Knee  Relieving Factors:  Rest      Previous Interventions/Treatments: Physical Therapy via [facility]    Prior Level of Function (PLOF)  Patient previously independent with all ADLs  Exercise/Physical Activity: Ind  Work/School: off work FMLA    Patients Living Environment: Reviewed and no concern    Primary Language: English    Patient's Goal(s) for Therapy: Get back to work and resume PLOF    Red Flags: Do you have any of the following? No  Fever/chills, unexplained weight changes, dizziness/fainting, unexplained change in bowel or bladder functions, unexplained malaise or muscle weakness, night pain/sweats, numbness or tingling    Objective:    AROM flex 84deg  AROM ext (-15) deg    Posture: WNL    Lower Extremity Functional Movements  Transfers: Ind  Gait: Ind          Outcome Measures:  LEFS 63       EDUCATION: Pt educated in HEP, PT POC, anatomy, activity avoidance, proper positioning/posture, use of R knee , pt demonstrates understanding of PT info, all questions answered.    HEP: currently providedfrom [facility]    Goals: Set and discussed today  Increase AROM   Descending Stairs Ind    Plan of care was developed with input and agreement by the patient.    Treatment Performed:  Supine HS 5x30s  QS on bolster to encourage ext 5x30s  Sci fit  Bike 5'  Heel Slides 5x30s  Desc stairs w/ reciprocal pattern 2 HR's    Therapeutic Exercise:    23 min        Assessment: 59 y/o WF presents with c/o R TKA. Upon examination patient demonstrates impaired AROM limiting overall functional mobility including normal fxn. Pt to benefit from outpatient PT to address deficits, maximize functional mobility and improve QOL.  The clinical presentation of this patient is stable and their history and examination findings are consistent with a low complexity evaluation with good rehab potential.  Is noted to compensate for R kneeto increase fxn.     Plan:     Planned Interventions include: therapeutic exercise, self-care home management, manual therapy, therapeutic activities, gait training, neuromuscular coordination, vasopneumatic, dry needling, aquatic therapy  Frequency: 2-3x/ week  Duration: 4 Weeks      Basilio Beckett

## 2024-02-05 NOTE — PROGRESS NOTES
"Physical Therapy Treatment    Patient Name: Bri Mays  MRN: [id number]  Today's Date: 2/6/2024  Time Calculation  Start Time: 0248  Stop Time: 0330  Time Calculation (min): 42 min    Current Problem  1. Right anterior knee pain            Insurance:  COPAY 0 [phone]) 500(204) COVERAGE 90 OOP [phone]  Visit number: 2  Authorization info: NO AUTH REQ  Insurance Type: MMO SUPER MED   Precautions    R TKA     Subjective   Subjective:   Pt reports that her knee is feeling okay. Not too painful, but still stiff.   Pain   1/10    Objective   Flex AAROM 95*, ext -11* AROM  Treatments:  GÓMEZ 5'  Supine HS 5x30s  QS on bolster to encourage ext 5x30s  Knee flexion stretch on step 10 rocks x 30\" hold x2  Heel Slides 10\"x10  Desc stairs w/ reciprocal pattern 2 HR's----  Squat 20x  TKE w/ ball 5\" x15    Manual : PROM, PA tib mobs, Posterior femur mobs, STM to distal quad/ITB 15'  OP EDUCATION:   Access Code: [email or web address]  URL: [email or web address]  Date: 02/06/2024  Prepared by: Sharon Gee    Exercises  - Standing Knee Flexion Stretch on Step  - 1 x daily - 7 x weekly - 10 reps - 20 hold  - Standing Terminal Knee Extension at Wall with Ball  - 1 x daily - 7 x weekly - 20 reps - 5 hold      Assessment:   Pt not yet able to complete full revolutions on GÓMEZ. Added knee flex stretch on step with good tolerance. Also introduced squats to promote more functional bending of the knee. Pt also limited with knee ext, thus TKE performed with cues to not push back with the hips. Good overall completion of ex given.    Plan:   Cont to increase knee ROM              "

## 2024-02-06 ENCOUNTER — TREATMENT (OUTPATIENT)
Dept: PHYSICAL THERAPY | Facility: CLINIC | Age: 59
End: 2024-02-06
Payer: COMMERCIAL

## 2024-02-06 DIAGNOSIS — M25.561 RIGHT ANTERIOR KNEE PAIN: Primary | ICD-10-CM

## 2024-02-06 PROCEDURE — 97140 MANUAL THERAPY 1/> REGIONS: CPT | Mod: GP,CQ

## 2024-02-06 PROCEDURE — 97110 THERAPEUTIC EXERCISES: CPT | Mod: GP,CQ

## 2024-02-07 ENCOUNTER — HOSPITAL ENCOUNTER (OUTPATIENT)
Dept: RADIOLOGY | Facility: CLINIC | Age: 59
Discharge: HOME | End: 2024-02-07
Payer: COMMERCIAL

## 2024-02-07 ENCOUNTER — OFFICE VISIT (OUTPATIENT)
Dept: ORTHOPEDIC SURGERY | Facility: CLINIC | Age: 59
End: 2024-02-07
Payer: COMMERCIAL

## 2024-02-07 DIAGNOSIS — M17.11 PRIMARY OSTEOARTHRITIS OF RIGHT KNEE: ICD-10-CM

## 2024-02-07 PROCEDURE — 73560 X-RAY EXAM OF KNEE 1 OR 2: CPT | Mod: RIGHT SIDE | Performed by: ORTHOPAEDIC SURGERY

## 2024-02-07 PROCEDURE — 1036F TOBACCO NON-USER: CPT | Performed by: ORTHOPAEDIC SURGERY

## 2024-02-07 PROCEDURE — 99024 POSTOP FOLLOW-UP VISIT: CPT | Performed by: ORTHOPAEDIC SURGERY

## 2024-02-07 PROCEDURE — 73560 X-RAY EXAM OF KNEE 1 OR 2: CPT | Mod: RT

## 2024-02-07 NOTE — PROGRESS NOTES
History: Bri is here for a recheck of her right knee.  She is 6 weeks out from a right total knee arthroplasty.  Her pain is improving.  She has been working with therapy.  She still battling a bit of stiffness.  She is here today for follow-up.    Physical Exam: Wound is healing nicely.  She lacks about 3 degrees of extension and can straight leg raise easily.  She has limited flexion to 90 degrees today.  Stable posterior drawer.  She is neurovascularly intact distally.    Radiographs: AP and lateral views of the right knee show excellent alignment of her total knee arthroplasty with no evidence of loosening or acute bony abnormality.    Assessment: Stable right total knee arthroplasty, 6 weeks out    Plan: We discussed that she is a bit tight with flexion.  She did require a manipulation on her left total knee and she would like to avoid that on the side.  We discussed maximizing her flexion and therapy.  She is going to continue with an icing regimen.  She is wanting to get back to work next week but we will start her off doing 3 days a week and she can gradually increase time at work as tolerated.  She is given a note for this today.  She is going to follow-up in another month to assess progress.  If having continued tightness we may wish to consider manipulation under anesthesia.  All questions were answered the patient.

## 2024-02-07 NOTE — LETTER
February 7, 2024     Patient: Bri Mays   YOB: 1965   Date of Visit: 2/7/2024       To Whom it May Concern:    Bri Mays was seen in my clinic on 2/7/2024. She  may return to work on 2/12/2024 starting with 3 days a week.  She should sit down as needed.  She can gradually increase days per week as tolerated .    If you have any questions or concerns, please don't hesitate to call.         Sincerely,     Dhiraj Nair MD

## 2024-02-07 NOTE — PROGRESS NOTES
"Physical Therapy Treatment    Patient Name: Bri Mays  MRN: [id number]  Today's Date: 2/7/2024       Current Problem  No diagnosis found.      Insurance:  COPAY 0 [phone]) 500(204) COVERAGE 90 OOP [phone]  Visit number: 2  Authorization info: NO AUTH REQ  Insurance Type: MMO SUPER MED   Precautions    R TKA     Subjective   Subjective:   Pt reports that her knee is feeling okay. Not too painful, but still stiff.   Pain   1/10    Objective   Flex AAROM 95*, ext -11* AROM  Treatments:  GÓMEZ 5'  Supine HS 5x30s  QS on bolster to encourage ext 5x30s  Knee flexion stretch on step 10 rocks x 30\" hold x2  Heel Slides 10\"x10  Desc stairs w/ reciprocal pattern 2 HR's----  Squat 20x  TKE w/ ball 5\" x15    Manual : PROM, PA tib mobs, Posterior femur mobs, STM to distal quad/ITB 15'  OP EDUCATION:   Access Code: [email or web address]  URL: [email or web address]  Date: 02/06/2024  Prepared by: Sharon Gee    Exercises  - Standing Knee Flexion Stretch on Step  - 1 x daily - 7 x weekly - 10 reps - 20 hold  - Standing Terminal Knee Extension at Wall with Ball  - 1 x daily - 7 x weekly - 20 reps - 5 hold      Assessment:   Pt not yet able to complete full revolutions on GÓMEZ. Added knee flex stretch on step with good tolerance. Also introduced squats to promote more functional bending of the knee. Pt also limited with knee ext, thus TKE performed with cues to not push back with the hips. Good overall completion of ex given.    Plan:   Cont to increase knee ROM            "

## 2024-02-08 ENCOUNTER — APPOINTMENT (OUTPATIENT)
Dept: PHYSICAL THERAPY | Facility: CLINIC | Age: 59
End: 2024-02-08
Payer: COMMERCIAL

## 2024-02-12 NOTE — PROGRESS NOTES
"Physical Therapy Treatment    Patient Name: Bri Mays  MRN: [id number]  Today's Date: 2/13/2024  Time Calculation  Start Time: 1136  Stop Time: 1215  Time Calculation (min): 39 min     PT Therapeutic Procedures Time Entry  Manual Therapy Time Entry: 15  Therapeutic Exercise Time Entry: 23                 Current Problem  1. Right anterior knee pain            Insurance:  COPAY 0 [phone]) 500(204) COVERAGE 90 OOP [phone]  Visit number: 3  Authorization info: NO AUTH REQ  Insurance Type: MMO SUPER M  Precautions   R TKA    Subjective   Subjective:   Pt reports her knee is doing okay. She doesn't really have pain  Pain   2/10    Objective   Flex AAROM 100*, ext -7* AROM   Treatments:  GÓMEZ 5'(seat 6)  Supine HS 5x30s  QS on bolster to encourage ext 5\" x 15  Knee flexion stretch on step 10 rocks x 30\" hold x2---  Heel Slides 10\"x10   Desc stairs w/ reciprocal pattern 2 HR's----  Squat 20x  TKE w/ GTB 5\" x15     Manual : PROM, PA tib mobs, Posterior femur mobs, STM to distal quad/ITB 15'  OP EDUCATION:   Heelslides, lunge stretch on step, quad sets      Assessment:   Pt gradually improving upon her knee ROM, as seen with GÓMEZ and heelslides. Pt still has tightness at end ranges of motion and some swelling observed. Tightness palpated along ITB. Pt used good form with squats.     Plan:   Cont to increase knee ROM              "

## 2024-02-13 ENCOUNTER — TREATMENT (OUTPATIENT)
Dept: PHYSICAL THERAPY | Facility: CLINIC | Age: 59
End: 2024-02-13
Payer: COMMERCIAL

## 2024-02-13 DIAGNOSIS — M25.561 RIGHT ANTERIOR KNEE PAIN: Primary | ICD-10-CM

## 2024-02-13 PROCEDURE — 97140 MANUAL THERAPY 1/> REGIONS: CPT | Mod: GP,CQ

## 2024-02-13 PROCEDURE — 97110 THERAPEUTIC EXERCISES: CPT | Mod: GP,CQ

## 2024-02-14 NOTE — PROGRESS NOTES
"Physical Therapy Treatment    Patient Name: Bri Mays  MRN: [id number]  Today's Date: 2/15/2024  Time Calculation  Start Time: 0333  Stop Time: 0416  Time Calculation (min): 43 min     PT Therapeutic Procedures Time Entry  Manual Therapy Time Entry: 15  Therapeutic Exercise Time Entry: 26                 Current Problem  1. Right anterior knee pain            Insurance:  COPAY 0 (204) 500(204) COVERAGE 90 OOP [phone]  Visit number: 4  Authorization info: NO AUTH REQ  Insurance Type: MMO SUPER M  Precautions   R TKA     Subjective   Subjective:     Pain   1/10    Objective     Flex AAROM 102*, ext -5* AROM   Treatments:  GÓMEZ 5'(seat 6-5)  Supine Hamstring stretch 3x30s  Prone knee ext hang w/ STM to hamstrings 4'  Supine SLR 2x10  QS on bolster to encourage ext 5\" x 10  Prone quad stretch w/strap 30\"x3  Step ups F/L 4in 15x ea  Knee flexion stretch on step 10 rocks x 30\" hold x2---  Heel Slides 10\"x10   Desc stairs w/ reciprocal pattern 2 HR's----  Squat 20x  TKE w/ GTB 5\" x15     Manual : PROM, PA tib mobs, Posterior femur mobs, contract/relax in sitting for flex, STM to distal quad/ITB 15'  OP EDUCATION:     Access Code: [email or web address]  URL: [email or web address]  Date: 02/15/2024  Prepared by: Sharon eGe    Exercises  - Prone Quadriceps Stretch with Strap  - 1 x daily - 7 x weekly - 3 sets - 30 hold  - Prone Knee Extension with Ankle Weight  - 1 x daily - 7 x weekly - 5 hold    Assessment:   Pt gradually improving upon both knee flexion and extension. Added prone knee flexion stretch and prone knee ext stretch off table. Pt tolerated these fairly well. Also introduced step ups, with pt doing well and flexing the knee vs hiking the hip. Good form with squats and cues with TKE to not bend fwd at waist. Good overall tolerance to therapy session.     Plan:   Cont to increase knee ROM              "

## 2024-02-15 ENCOUNTER — TREATMENT (OUTPATIENT)
Dept: PHYSICAL THERAPY | Facility: CLINIC | Age: 59
End: 2024-02-15
Payer: COMMERCIAL

## 2024-02-15 DIAGNOSIS — M25.561 RIGHT ANTERIOR KNEE PAIN: Primary | ICD-10-CM

## 2024-02-15 PROCEDURE — 97110 THERAPEUTIC EXERCISES: CPT | Mod: GP,CQ

## 2024-02-15 PROCEDURE — 97140 MANUAL THERAPY 1/> REGIONS: CPT | Mod: GP,CQ

## 2024-02-19 NOTE — PROGRESS NOTES
"Physical Therapy Treatment    Patient Name: Bri Mays  MRN: [id number]  Today's Date: 2/20/2024    Time Calculation  Start Time: 1135  Stop Time: 1213  Time Calculation (min): 38 min   Current Problem  1. Aftercare following right knee joint replacement surgery              Insurance:  COPAY 0 [phone]) [phone]) COVERAGE 90 OOP [phone]  Visit number: 5  Authorization info: NO AUTH REQ  Insurance Type: MMO SUPER M  Precautions   R TKA     Subjective   Pt states she is feeling fine w/ no overt pain, but does not feel R knee is bending as well as she wants it to.     Pain  Pain Assessment: 0-10  Pain Score: 0 - No pain    Objective     Flex AROM 92*, ext -8* AROM   Treatments:   GÓMEZ 8' (seat 6-5)   Supine HS in combo w/ QS 15x5s ea   SLR in combo w/ HS 15x   PRONE R QUAD Assisted stretch 5x30 s  Supine Hamstring stretch 3x30s NT  Prone knee ext hang w/ STM to hamstrings 4' NT  Supine SLR 2x10 NT  QS on bolster to encourage ext 5\" x 10 NT  Prone quad stretch w/strap 30\"x3 NT  Step ups F/L 4in 15x ea NT  Knee flexion stretch on step 10 rocks x 30\" hold x2--- NT  Heel Slides 10\"x10 NT  Desc stairs w/ reciprocal pattern 2 HR's----  Squat 20x NT  TKE w/ GTB 5\" x15 NT     Manual : PROM, PA tib mobs, Posterior femur mobs, contract/relax in sitting for flex, STM to distal quad/ITB 15'  OP EDUCATION:     Access Code: [email or web address]  URL: [email or web address]  Date: 02/15/2024  Prepared by: Sharon Gee    Exercises  - Prone Quadriceps Stretch with Strap  - 1 x daily - 7 x weekly - 3 sets - 30 hold  - Prone Knee Extension with Ankle Weight  - 1 x daily - 7 x weekly - 5 hold    Assessment:     Pt able to tolerate tx session well this date w/ no adverse effects noted from tx.  Pt compliant w/ program and appears to understand rationale for therapy.  Understands need not to overdo stretching to avoid mm from tightening.         Plan:     Cont w current POC and progress pt as tolerated.     "

## 2024-02-20 ENCOUNTER — TREATMENT (OUTPATIENT)
Dept: PHYSICAL THERAPY | Facility: CLINIC | Age: 59
End: 2024-02-20
Payer: COMMERCIAL

## 2024-02-20 DIAGNOSIS — Z96.651 AFTERCARE FOLLOWING RIGHT KNEE JOINT REPLACEMENT SURGERY: Primary | ICD-10-CM

## 2024-02-20 DIAGNOSIS — Z47.1 AFTERCARE FOLLOWING RIGHT KNEE JOINT REPLACEMENT SURGERY: Primary | ICD-10-CM

## 2024-02-20 PROCEDURE — 97110 THERAPEUTIC EXERCISES: CPT | Mod: GP

## 2024-02-20 ASSESSMENT — PAIN SCALES - GENERAL: PAINLEVEL_OUTOF10: 0 - NO PAIN

## 2024-02-20 ASSESSMENT — PAIN - FUNCTIONAL ASSESSMENT: PAIN_FUNCTIONAL_ASSESSMENT: 0-10

## 2024-02-22 ENCOUNTER — TREATMENT (OUTPATIENT)
Dept: PHYSICAL THERAPY | Facility: CLINIC | Age: 59
End: 2024-02-22
Payer: COMMERCIAL

## 2024-02-22 DIAGNOSIS — M25.561 RIGHT ANTERIOR KNEE PAIN: Primary | ICD-10-CM

## 2024-02-22 PROCEDURE — 97140 MANUAL THERAPY 1/> REGIONS: CPT | Mod: GP,CQ

## 2024-02-22 PROCEDURE — 97110 THERAPEUTIC EXERCISES: CPT | Mod: GP,CQ

## 2024-02-22 ASSESSMENT — PAIN - FUNCTIONAL ASSESSMENT: PAIN_FUNCTIONAL_ASSESSMENT: 0-10

## 2024-02-22 ASSESSMENT — PAIN SCALES - GENERAL: PAINLEVEL_OUTOF10: 2

## 2024-02-22 NOTE — PROGRESS NOTES
"Physical Therapy Treatment    Patient Name: Bri Mays  MRN: [id number]  Today's Date: 2/22/2024  Time Calculation  Start Time: 1130  Stop Time: 1210  Time Calculation (min): 40 min     PT Therapeutic Procedures Time Entry  Manual Therapy Time Entry: 15  Therapeutic Exercise Time Entry: 25                 Insurance:   COPAY 0 (204) 500(204) COVERAGE 90 OOP [phone]  Visit number: 6  Authorization info: NO AUTH REQ  Insurance Type: MMO SUPER M  Assessment:   Pt presented w/ improved R knee flex and ext following manual and stretches/ex's today. Good kyaw to given ex's w/ some cues for holds/pacing/technique. Emphasis on R TKE in stdg w/ ex's to facilitate improved knee ext. She is making nice progress at this time.     Plan:   Cont w current POC and progress pt as tolerated. Re-check sched 3/7/24 w/ MD fu 3/11/24.     Current Problem  1. Right anterior knee pain          Subjective   General  Reason for Referral: 12/26/23 R TKA  Referred By: Dr. Dhiraj Nair Pt states she is a little sore today as she had a \"good workout\" last visit. No new c/o reported.   Precautions       Pain  Pain Assessment: 0-10  Pain Score: 2    Objective   2/20/24 Flex AROM 92*, ext -8* AROM    2/22/24 Flex 100* ,Ext -6* AROM  Treatments:  Therapeutic Exercise (68898):   GÓMEZ 8' (seat 6-5)   Supine HS in combo w/ QS 15x5s ea NT   SLR in combo w/ HS 15x   PRONE R QUAD Assisted stretch 3x30s  Supine Hamstring stretch 3x30s NT  Prone knee ext hang w/ STM to hamstrings 4' NT  Supine SLR x15  QS on bolster to encourage ext 5\" x 10   Prone quad stretch w/strap 30\"x3 NT  Step ups F/L 4in 15x ea   Knee flexion stretch on step 1 rocks x 30\" hold x2--- NT  Heel Slides 10\"x10   Desc stairs w/ reciprocal pattern 2 HR's----  Squat 20x NT  TKE w/ GTB 5\" x15 NT    Manual (71618): PROM, PA tib mobs, Posterior femur mobs, contract/relax in sitting for flex, STM to distal quad/ITB 15'     EDUCATION:         "

## 2024-02-26 NOTE — PROGRESS NOTES
"Physical Therapy Treatment    Patient Name: Bri Mays  MRN: [id number]  Today's Date: 2/27/2024  Time Calculation  Start Time: 1136  Stop Time: 1217  Time Calculation (min): 41 min     PT Therapeutic Procedures Time Entry  Manual Therapy Time Entry: 15  Therapeutic Exercise Time Entry: 24                 Current Problem  1. Right anterior knee pain            Insurance:  COPAY 0 [phone]) [phone]) COVERAGE 90 OOP [phone]  Visit number: 7  Authorization info: NO AUTH REQ  Insurance Type: MMO SUPER M  Precautions       Subjective   Subjective:   Pt reports that her knee has felt more stiff the past couple days  Pain   2/10    Objective   2/20/24 Flex AROM 92*, ext -8* AROM    2/22/24 Flex 100* ,Ext -6* AROM:  2/27/24 flex 103*, -4* AROM  Treatments   GÓMEZ 6' (seat 6-5)  Heel Slides 10\"x10    Prone knee ext hang w/ STM to hamstrings 2# 4'   Step ups F/L 6in 15x ea    PRONE R QUAD Assisted stretch 3x30s  Prone quad set 5\"x10*  Supine Hamstring stretch 3x30s NT  Supine HS in combo w/ QS 15x5s ea NT  Supine SLR x15NT  QS on bolster to encourage ext 5\" x 10 NT  Knee flexion stretch on step 1 rocks x 30\" hold x2--- NT   SLR in combo w/ HS 15xNT  Desc stairs w/ reciprocal pattern 2 HR's----  Squat 20x NT  TKE w/ GTB 5\" x15 NT     Manual (60463): PROM, PA tib mobs, Posterior femur mobs, contract/relax in sitting for flex, STM to distal quad/ITB 15'   OP EDUCATION:   Knee ext with heel strike      Assessment:   Pt gradually improving upon her knee ROM in both flex and ext, though seems more tight with flexion. Added hip flexor stretch, which pt tolerated well. Able to increase height for step ups, while maintaining, good knee bending. Also trialed prone TKE without elevating the hips off of table. Good overall completion of ex given    Plan:   Cont with knee ROM              "

## 2024-02-27 ENCOUNTER — TREATMENT (OUTPATIENT)
Dept: PHYSICAL THERAPY | Facility: CLINIC | Age: 59
End: 2024-02-27
Payer: COMMERCIAL

## 2024-02-27 DIAGNOSIS — M25.561 RIGHT ANTERIOR KNEE PAIN: Primary | ICD-10-CM

## 2024-02-27 PROCEDURE — 97140 MANUAL THERAPY 1/> REGIONS: CPT | Mod: GP,CQ

## 2024-02-27 PROCEDURE — 97110 THERAPEUTIC EXERCISES: CPT | Mod: GP,CQ

## 2024-02-28 NOTE — PROGRESS NOTES
"Physical Therapy Treatment    Patient Name: Bri Mays  MRN: [id number]  Today's Date: 2/29/2024                            Current Problem  No diagnosis found.      Insurance:  COPAY 0 [phone]) COVERAGE 90 OOP [phone]  Visit number: 7  Authorization info: NO AUTH REQ  Insurance Type: MMO SUPER M  Precautions       Subjective   Subjective:   Pt reports that her knee has felt more stiff the past couple days  Pain   2/10    Objective   2/20/24 Flex AROM 92*, ext -8* AROM    2/22/24 Flex 100* ,Ext -6* AROM:  2/27/24 flex 103*, -4* AROM  2/29/2024-NT    Advised pt to use recumbent bike at home, no more than 10 min w/ no resistance.    Treatments   GÓMEZ 7' (seat 6-5)  Heel Slides 10\"x10 NT   Prone knee ext hang w/ STM to hamstrings 2# 4' NT  Step ups F/L 6in 15x ea NT   PRONE R QUAD Strap stretch 5x20s  Supine Hamstring stretch 3x30s NT  Supine HS in combo w/ QS 15x5s ea NT  Supine SLR x15NT  QS on bolster to encourage ext 5\" x 10 NT  Knee flexion stretch on step 1 rocks x 30\" hold x2--- NT   SLR in combo w/ HS 15xNT  Desc stairs w/ reciprocal pattern 2 HR's 3x  Squat 20x NT  TKE w/ GTB 5\" x15 NT  Wall calf/soleus stretch 5x20 s ea  4-Way SLR hold 5\" 10x     Manual (17841): NT     OP EDUCATION:   Stretch program to increase hold for better stretch of R Quad.      Assessment:     Pt able to tolerate tx session well this date w/ no adverse effects noted from tx.  Pt compliant w/ program and appears to understand rationale for therapy.  Pt making proper gains.  Although compliant, pt was not holding stretches long enough to effectively make gains, susi for knee bending when descending stairs.         Plan:     Cont w current POC and progress pt as tolerated.               "

## 2024-02-29 ENCOUNTER — TREATMENT (OUTPATIENT)
Dept: PHYSICAL THERAPY | Facility: CLINIC | Age: 59
End: 2024-02-29
Payer: COMMERCIAL

## 2024-02-29 DIAGNOSIS — M25.561 RIGHT ANTERIOR KNEE PAIN: Primary | ICD-10-CM

## 2024-02-29 PROCEDURE — 97110 THERAPEUTIC EXERCISES: CPT | Mod: GP

## 2024-02-29 ASSESSMENT — PAIN SCALES - GENERAL: PAINLEVEL_OUTOF10: 2

## 2024-02-29 ASSESSMENT — PAIN - FUNCTIONAL ASSESSMENT: PAIN_FUNCTIONAL_ASSESSMENT: 0-10

## 2024-03-04 NOTE — PROGRESS NOTES
"Physical Therapy Treatment    Patient Name: Bri aMys  MRN: [id number]  Today's Date: 3/5/2024  Time Calculation  Start Time: 0418  Stop Time: 0500  Time Calculation (min): 42 min     PT Therapeutic Procedures Time Entry  Manual Therapy Time Entry: 12  Therapeutic Exercise Time Entry: 28                 Current Problem  1. Right anterior knee pain            Insurance:  COPAY 0 [phone]) [phone]) COVERAGE 90 OOP [phone]  Visit number: 8  Authorization info: NO AUTH REQ  Insurance Type: MMO SUPER M  Precautions   none    Subjective   Subjective:   Pt reports that her knee is doing pretty good. It is a little tired and stiff. She worked today.   Pain   2/10    Objective   2/20/24 Flex AROM 92*, ext -8* AROM    2/22/24 Flex 100* ,Ext -6* AROM:  2/27/24 flex 103*, -4* AROM  2/29/2024-NT    3/5/24 flex 100* AAROM,  -3* ext AROM      Treatments:    GÓMEZ 7' (seat 5)  Heel Slides 10\"x10   Supine heelslides on wall 5x10\"  Supine knee flexion with LE on PB 10x   Prone knee ext hang w/ STM to hamstrings 2# 4' NT  Step ups F/L 6in 15x ea  Leg press DL 40# 20x   PRONE R QUAD Strap stretch 5x20s  Seated Hamstring stretch 3x30s   Standing calf/soleus stretch 2x30 s ea  Supine HS in combo w/ QS 15x5s ea NT  Supine SLR x15NT  QS on bolster to encourage ext 5\" x 10 NT  Knee flexion stretch on step 1 rocks x 30\" hold x2--- NT   SLR in combo w/ HS 15xNT  Desc stairs w/ reciprocal pattern 2 HR's 3x N  Squat 20x NT  TKE w/ GTB 5\" x15 NT    4-Way SLR hold 5\" 10xNT     Manual (83232):PROM, tib PA mobs, contract/relax 12'  OP EDUCATION:   Dont over do it with ex, to the point where pain and swelling increase a lot      Assessment:   Pt cont to have tightness displayed with her knee, as displayed with heel slides. Tried heel slides on wall and PB knee flexion for more ROM, pt seemed to push it a lot with wall slides. Added leg press for quad strength and to promote more knee flexion. Pt tolerated this well and did good " with step ups. Good overall completion of ex session    Plan:   Increase knee ROM

## 2024-03-05 ENCOUNTER — TREATMENT (OUTPATIENT)
Dept: PHYSICAL THERAPY | Facility: CLINIC | Age: 59
End: 2024-03-05
Payer: COMMERCIAL

## 2024-03-05 DIAGNOSIS — M25.561 RIGHT ANTERIOR KNEE PAIN: Primary | ICD-10-CM

## 2024-03-05 PROCEDURE — 97140 MANUAL THERAPY 1/> REGIONS: CPT | Mod: GP,CQ

## 2024-03-05 PROCEDURE — 97110 THERAPEUTIC EXERCISES: CPT | Mod: GP,CQ

## 2024-03-05 NOTE — PROGRESS NOTES
"Physical Therapy Treatment    Patient Name: Bri Mays  MRN: [id number]  Today's Date: 3/5/2024      Current Problem  No diagnosis found.      Insurance:  COPAY 0 [phone]) [phone]) COVERAGE 90 OOP [phone]  Visit number: 10  Authorization info: NO AUTH REQ  Insurance Type: MMO SUPER M  Precautions       Subjective   Subjective:   Pt reports that her knee has felt more stiff the past couple days  Pain   2/10    Objective   2/20/24 Flex AROM 92*, ext -8* AROM    2/22/24 Flex 100* ,Ext -6* AROM:  2/27/24 flex 103*, -4* AROM  2/29/2024-NT    Advised pt to use recumbent bike at home, no more than 10 min w/ no resistance.    Treatments   GÓMEZ 7' (seat 6-5)  Heel Slides 10\"x10 NT   Prone knee ext hang w/ STM to hamstrings 2# 4' NT  Step ups F/L 6in 15x ea NT   PRONE R QUAD Strap stretch 5x20s  Supine Hamstring stretch 3x30s NT  Supine HS in combo w/ QS 15x5s ea NT  Supine SLR x15NT  QS on bolster to encourage ext 5\" x 10 NT  Knee flexion stretch on step 1 rocks x 30\" hold x2--- NT   SLR in combo w/ HS 15xNT  Desc stairs w/ reciprocal pattern 2 HR's 3x  Squat 20x NT  TKE w/ GTB 5\" x15 NT  Wall calf/soleus stretch 5x20 s ea  4-Way SLR hold 5\" 10x     Manual (52167): NT     OP EDUCATION:   Stretch program to increase hold for better stretch of R Quad.      Assessment:     Pt able to tolerate tx session well this date w/ no adverse effects noted from tx.  Pt compliant w/ program and appears to understand rationale for therapy.  Pt making proper gains.  Although compliant, pt was not holding stretches long enough to effectively make gains, susi for knee bending when descending stairs.         Plan:     Cont w current POC and progress pt as tolerated.             "

## 2024-03-07 ENCOUNTER — APPOINTMENT (OUTPATIENT)
Dept: PHYSICAL THERAPY | Facility: CLINIC | Age: 59
End: 2024-03-07
Payer: COMMERCIAL

## 2024-03-07 DIAGNOSIS — M17.12 OSTEOARTHRITIS OF LEFT KNEE, UNSPECIFIED OSTEOARTHRITIS TYPE: ICD-10-CM

## 2024-03-07 RX ORDER — CELECOXIB 200 MG/1
200 CAPSULE ORAL DAILY
Qty: 30 CAPSULE | Refills: 2 | Status: SHIPPED | OUTPATIENT
Start: 2024-03-07

## 2024-03-08 NOTE — PROGRESS NOTES
"Physical Therapy Treatment/Recheck    Patient Name: Bri Mays  MRN: [id number]  Today's Date: 3/11/2024    Current Problem  1. Right anterior knee pain              Insurance:  COPAY 0 [phone]) [phone]) COVERAGE 90 OOP [phone]  Visit number: 10  Authorization info: NO AUTH REQ  Insurance Type: MMO SUPER M  Precautions       Subjective   Subjective:  Reason for Referral: 12/26/23 R TKA  Referred By: Dr. Dhiraj Rodriguez states she saw MD who has released her, but wanted her to get to 110 deg of R knee flex.  Has not used personal bike at home yet.    Pain      Objective   Advised pt to hold stetching exs longer up to 30s and decrease reps  2/20/24 Flex AROM 92*, ext -8* AROM    2/22/24 Flex 100* ,Ext -6* AROM:  2/27/24 flex 103*, -4* AROM  2/29/2024-NT    3/11/2024  F 110 *  E -4 -AAROM    LEFS 60  Observed up/down stairs w/ 2 HR's -still with some compensation w/ descent    Treatments   GÓMEZ 7' (seat 6-5)  Heel Slides w/ strap 10x30s   Prone knee ext hang w/ STM to hamstrings 2# 4' NT  Step ups F/L 6in 15x ea NT   PRONE R QUAD Strap stretch 10x30s  Supine leg over table Quad stretch 5x30s  Supine Hamstring stretch 3x30s NT  Supine HS in combo w/ QS 15x5s ea NT  Supine SLR x15NT  QS on bolster to encourage ext 5\" x 10 NT  Knee flexion stretch on step 1 rocks x 30\" hold x2--- NT   SLR in combo w/ HS 15xNT  Desc stairs w/ reciprocal pattern 2 HR's 1x  Squat 20x NT  TKE w/ GTB 5\" x15 NT  Wall calf/soleus stretch 5x20 s ea  4-Way SLR hold 5\" 10x     Manual (59253): NT     OP EDUCATION:   Stretch program to increase hold for better stretch of R Quad.     Assessment:   Pt able to tolerate tx session well this date w/ no adverse effects noted from tx.  Pt compliant w/ program and appears to understand rationale for therapy.     Plan:   Cont w current POC 2x/wk for 4 wks and progress AROM of R knee as tolerated.       Goals:  Increase AROM of R knee to 110  Independent w/ HEP    " Post-Care Instructions: I reviewed with the patient in detail post-care instructions. Patient is to wear sunprotection, and avoid picking at any of the treated lesions. Pt may apply Vaseline to crusted or scabbing areas. Render Post-Care Instructions In Note?: no Detail Level: Detailed Number Of Freeze-Thaw Cycles: 1 freeze-thaw cycle Duration Of Freeze Thaw-Cycle (Seconds): 3 Consent: The patient's consent was obtained including but not limited to risks of crusting, scabbing, blistering, scarring, darker or lighter pigmentary change, recurrence, incomplete removal and infection.

## 2024-03-11 ENCOUNTER — OFFICE VISIT (OUTPATIENT)
Dept: ORTHOPEDIC SURGERY | Facility: CLINIC | Age: 59
End: 2024-03-11
Payer: COMMERCIAL

## 2024-03-11 ENCOUNTER — TREATMENT (OUTPATIENT)
Dept: PHYSICAL THERAPY | Facility: CLINIC | Age: 59
End: 2024-03-11
Payer: COMMERCIAL

## 2024-03-11 ENCOUNTER — HOSPITAL ENCOUNTER (OUTPATIENT)
Dept: RADIOLOGY | Facility: CLINIC | Age: 59
Discharge: HOME | End: 2024-03-11
Payer: COMMERCIAL

## 2024-03-11 DIAGNOSIS — M17.11 PRIMARY OSTEOARTHRITIS OF RIGHT KNEE: ICD-10-CM

## 2024-03-11 DIAGNOSIS — M25.561 RIGHT ANTERIOR KNEE PAIN: Primary | ICD-10-CM

## 2024-03-11 PROCEDURE — 1036F TOBACCO NON-USER: CPT | Performed by: ORTHOPAEDIC SURGERY

## 2024-03-11 PROCEDURE — 73560 X-RAY EXAM OF KNEE 1 OR 2: CPT | Mod: RIGHT SIDE | Performed by: ORTHOPAEDIC SURGERY

## 2024-03-11 PROCEDURE — 73560 X-RAY EXAM OF KNEE 1 OR 2: CPT | Mod: RT

## 2024-03-11 PROCEDURE — 99024 POSTOP FOLLOW-UP VISIT: CPT | Performed by: ORTHOPAEDIC SURGERY

## 2024-03-11 PROCEDURE — 97110 THERAPEUTIC EXERCISES: CPT | Mod: GP

## 2024-03-11 ASSESSMENT — PAIN SCALES - GENERAL: PAINLEVEL_OUTOF10: 0 - NO PAIN

## 2024-03-11 ASSESSMENT — PAIN - FUNCTIONAL ASSESSMENT: PAIN_FUNCTIONAL_ASSESSMENT: 0-10

## 2024-03-11 NOTE — PROGRESS NOTES
History: Apryl is here for her right knee.  She is 2-1/2 months from a total knee arthroplasty.  Overall she feels she is doing well.  Her left knee did require manipulation under anesthesia and she is happy with that knee getting to 110 degrees.  She is to about 105 degrees with the right knee already.    Exam: Exam today shows mild swelling and warmth normal for the stage of healing.  Motion today is from 2 to 105 degrees.  Good stability with posterior drawer testing.  Minimal pain medially or laterally.  No numbness.    Radiographs: X-rays today show good alignment of her total knee arthroplasty in all planes.    Impression: Stable right total knee arthroplasty 2-1/2 months out    Plan: She is still a bit tight but is again tight on the left side as well.  Overall however she is very happy and does not wish to proceed with a manipulation under anesthesia at this point.  I have urged her to continue to work on exercises and ice accordingly.  She will follow-up over the next 6 to 8 weeks if having any difficulties.

## 2024-03-25 ENCOUNTER — APPOINTMENT (OUTPATIENT)
Dept: PHYSICAL THERAPY | Facility: CLINIC | Age: 59
End: 2024-03-25
Payer: COMMERCIAL

## 2024-03-28 ENCOUNTER — OFFICE VISIT (OUTPATIENT)
Dept: PRIMARY CARE | Facility: CLINIC | Age: 59
End: 2024-03-28
Payer: COMMERCIAL

## 2024-03-28 VITALS
OXYGEN SATURATION: 97 % | HEART RATE: 96 BPM | DIASTOLIC BLOOD PRESSURE: 78 MMHG | HEIGHT: 63 IN | WEIGHT: 212.2 LBS | SYSTOLIC BLOOD PRESSURE: 128 MMHG | BODY MASS INDEX: 37.6 KG/M2 | RESPIRATION RATE: 17 BRPM

## 2024-03-28 DIAGNOSIS — F41.1 GENERALIZED ANXIETY DISORDER: ICD-10-CM

## 2024-03-28 DIAGNOSIS — R63.5 WEIGHT GAIN: ICD-10-CM

## 2024-03-28 DIAGNOSIS — Z12.31 ENCOUNTER FOR SCREENING MAMMOGRAM FOR MALIGNANT NEOPLASM OF BREAST: ICD-10-CM

## 2024-03-28 PROCEDURE — 1036F TOBACCO NON-USER: CPT | Performed by: FAMILY MEDICINE

## 2024-03-28 PROCEDURE — 99213 OFFICE O/P EST LOW 20 MIN: CPT | Performed by: FAMILY MEDICINE

## 2024-03-28 PROCEDURE — 3078F DIAST BP <80 MM HG: CPT | Performed by: FAMILY MEDICINE

## 2024-03-28 PROCEDURE — 3074F SYST BP LT 130 MM HG: CPT | Performed by: FAMILY MEDICINE

## 2024-03-28 RX ORDER — ALPRAZOLAM 0.5 MG/1
0.5 TABLET ORAL NIGHTLY PRN
Qty: 30 TABLET | Refills: 2 | Status: SHIPPED | OUTPATIENT
Start: 2024-03-28

## 2024-03-28 NOTE — PROGRESS NOTES
Subjective   Patient ID: Bri Mays is a 58 y.o. female who presents for Anxiety and Weight Gain.  HPI    Anxiety follow up  Taking the xanax   Working well   80-90 % effective   Denies any side effects   [place]AR[place] reviewed today   CSA signed  Last took last week     Weight gain  Patient admits that she is recovering from a knee replacement   Patient admits that she has been eating vegetables, watching her carb's, eating fruit.   Patients last in office weight was 215.3 lbs  Today's in office weight was 212.2 lbs   Patient has tried adipex in the past       BW done 12/08/2023  Mamm ordered     Review of Systems  Constitutional:  no chills, no fever and no night sweats.  Eyes: no blurred vision and no eyesight problems.  ENT: no hearing loss, no nasal congestion, no hoarseness and no sore throat.  Neck: no mass (es) and no swelling.  Cardiovascular: no chest pain, no intermittent leg claudication, no lower extremity edema, no palpitation and no syncope.  Respiratory: no cough, no shortness of breath during exertion, no shortness of breath at rest and no wheezing.  Gastrointestinal: no abdominal pain, no blood in stools, no constipation, no diarrhea, no melena, no nausea, no rectal pain and no vomiting.  Genitourinary: no dysuria, no change in urinary frequency, no urinary hesitancy and no feelings of urinary urgency.  Musculoskeletal: no arthralgias, no back pain and no myalgias.  Integumentary: no new skin lesions and no rashes.  Neurological: no difficulty walking, no headache, no limb weakness, no numbness and no tingling.  Psychiatric/Behavioral: no anxiety, no depression, no anhedonia and no substance use disorders.  Endocrine: no recent weight gain and no recent weight loss.  Hematologic/Lymphatic: no tendency for easy bruising and no swollen glands    Objective   Physical Exam  Patient in for follow-up anxiety chronic knee pain status post right knee replacement hyperlipidemia arthritis anxiety.  Chronic  "problems are stable she is approximately 3 and half months out from the surgery is doing therapy she is improving still little stiffness but overall pain is decreased she is gained a lot of weight she wanted to talk about weight loss options she had been on Adipex in the past with minimal success.  We discussed use of Zepp bound we will see if she can get that covered if not semaglutide at compounding pharmacy.  Physical exam today's office visit constitutional alert and oriented x3.    Head is atraumatic HEENT is within normal limits.    Neck supple no masses full range of motion.    Thyroid is normal in size no thyromegaly there is no carotid bruits.    Pulmonary exam shows clear to auscultation no respiratory distress.    Cardiovascular shows no murmur rub or gallop.  Regular rate and rhythm.    Abdominal exam soft nontender no hepatosplenomegaly or masses normal bowel sounds no rebound no guarding.    Musculoskeletal exam no joint pain no muscle pain full range of motion.    Psych exam normal mood and affect.    Dermatologic exam no skin lesions no rash no blemishes.    Neuro exam is no focal deficits.  Normal exam.    Extremities no edema normal pulses normal capillary refill.    /78   Pulse 96   Resp 17   Ht 1.6 m (5' 3\")   Wt 96.3 kg (212 lb 3.2 oz)   SpO2 97%   BMI 37.59 kg/m²     Lab Results   Component Value Date    WBC 4.8 12/08/2023    HGB 13.6 12/08/2023    HCT 41.3 12/08/2023    MCV 98 12/08/2023     12/08/2023       Assessment/Plan weight gain chronic problem stable status post right knee replacement.  Follow-up if we need to send in a different prescription routine recheck 3 months after starting on weight loss compounds.  Problem List Items Addressed This Visit          Endocrine/Metabolic    Weight gain       Mental Health    Generalized anxiety disorder     Other Visit Diagnoses       Encounter for screening mammogram for malignant neoplasm of breast              "

## 2024-04-01 ENCOUNTER — APPOINTMENT (OUTPATIENT)
Dept: PHYSICAL THERAPY | Facility: CLINIC | Age: 59
End: 2024-04-01
Payer: COMMERCIAL

## 2024-04-02 DIAGNOSIS — R63.5 WEIGHT GAIN: ICD-10-CM

## 2024-04-02 NOTE — TELEPHONE ENCOUNTER
Patient is calling in regards to the rx for the weight loss med, Zepbound that you sent over on 3/28/24. She states she thought it was denied but there was an approval in her chart for that med until 2024.  Patient called to her pharmacy and they need a new rx sent in for this    Rx Refill Request Telephone Encounter    Name:  Bri Mays  : 1965     Medication Name:  Zepbound  Dose (Optional):    2.5 mg/ 0.5 ml injection   Quantity (Optional):    4 each  Directions (Optional):   Inject 2.5 mg under the skin every 7 days     ALLERGIES:   nkda     Specific Pharmacy location:  [facility]     Date of last appointment:  3/28/24  Date of next appointment:  none     Best number to reach patient:  [phone]

## 2024-04-06 DIAGNOSIS — E78.5 DYSLIPIDEMIA: ICD-10-CM

## 2024-04-08 RX ORDER — ROSUVASTATIN CALCIUM 20 MG/1
20 TABLET, COATED ORAL DAILY
Qty: 90 TABLET | Refills: 0 | Status: SHIPPED | OUTPATIENT
Start: 2024-04-08

## 2024-04-09 ENCOUNTER — APPOINTMENT (OUTPATIENT)
Dept: PHYSICAL THERAPY | Facility: CLINIC | Age: 59
End: 2024-04-09
Payer: COMMERCIAL

## 2024-04-15 ENCOUNTER — APPOINTMENT (OUTPATIENT)
Dept: PHYSICAL THERAPY | Facility: CLINIC | Age: 59
End: 2024-04-15
Payer: COMMERCIAL

## 2024-04-29 ENCOUNTER — TELEPHONE (OUTPATIENT)
Dept: PRIMARY CARE | Facility: CLINIC | Age: 59
End: 2024-04-29
Payer: COMMERCIAL

## 2024-04-29 DIAGNOSIS — R63.5 WEIGHT GAIN: ICD-10-CM

## 2024-04-29 NOTE — TELEPHONE ENCOUNTER
PRIOR AUTHORIZATION REQUEST     Bri Mays  1965  [phone]      Medication Name: tirzepatide, weight loss, (Zepbound) 2.5 mg/0.5 mL injection   QTY: 4  Directions: Inject 2.5 mg under the skin every 7 days.     Insurance Co: MED[facility]   Insurance ID: [id number]   Insurance PH: [phone] AUTHORIZATION REQUEST     Pt called her insurance and they told her that this was only approved one time. She can't get any refils    Pharmacy kicked it back as well    Pt is out of medication, so the insurance said we could put it through as an emergency case.

## 2024-05-02 NOTE — TELEPHONE ENCOUNTER
CALLED AND SPOKE TO PATIENT'S INSURANCE COMPANY IN REGARDS TO THE ZEPBOUND 2.5 MG. PER HER INSURANCE THEY ONLY COVER THAT DOSAGE FOR 1 MONTH AND EXPECT PATIENT TO BE INCREASED TO THE NEXT DOSAGE.    PATIENT IS WANTING TO KNOW IF IT IS OK TO INCREASE HER TO THE NEXT DOSAGE OF ZEPBOUND 5 MG    IF SO SHE WILL NEED A NEW SCRIPT SENT TO HER PHARMACY.      PLEASE ADVISE.

## 2024-05-06 DIAGNOSIS — F41.1 GENERALIZED ANXIETY DISORDER: ICD-10-CM

## 2024-05-06 RX ORDER — FLUOXETINE HYDROCHLORIDE 40 MG/1
40 CAPSULE ORAL DAILY
Qty: 90 CAPSULE | Refills: 0 | Status: SHIPPED | OUTPATIENT
Start: 2024-05-06

## 2024-06-05 ENCOUNTER — TELEPHONE (OUTPATIENT)
Dept: PRIMARY CARE | Facility: CLINIC | Age: 59
End: 2024-06-05
Payer: COMMERCIAL

## 2024-06-05 ENCOUNTER — APPOINTMENT (OUTPATIENT)
Dept: RADIOLOGY | Facility: CLINIC | Age: 59
End: 2024-06-05
Payer: COMMERCIAL

## 2024-06-05 NOTE — TELEPHONE ENCOUNTER
ISSUES WITH INSURANCE AND ZEPOUND     Patient calling in regards to their Zepbound. Their insurance will have them stop their medication for a period of time and then when they can start again, their insurance will make them go up a high dose.     From the patient, the insurance will cover 2.5-15mg and once the patient reaches that last dosage, they won't cover it until next year. Patient would like to stop this and stay on the same dosage.     Please advise and thank you

## 2024-06-07 DIAGNOSIS — R63.5 WEIGHT GAIN: ICD-10-CM

## 2024-06-07 NOTE — TELEPHONE ENCOUNTER
Called the patient back for more information. I talked with patient and Galina. The patient liked the 2.5mg but cannot stay on the same dosage without her insurance requiring a prior authorization. The pharmacy told that her insurance requires her to go up in dosage.     Galina suggested to resend in a prescription for 2.5mg and try to do a prior authorization for that specific dosage. [facility] pharmacy     Please advise

## 2024-06-17 ENCOUNTER — HOSPITAL ENCOUNTER (OUTPATIENT)
Dept: RADIOLOGY | Facility: CLINIC | Age: 59
Discharge: HOME | End: 2024-06-17
Payer: COMMERCIAL

## 2024-06-17 VITALS — WEIGHT: 201 LBS | BODY MASS INDEX: 35.61 KG/M2 | HEIGHT: 63 IN

## 2024-06-17 DIAGNOSIS — Z12.31 ENCOUNTER FOR SCREENING MAMMOGRAM FOR MALIGNANT NEOPLASM OF BREAST: ICD-10-CM

## 2024-06-17 PROCEDURE — 77063 BREAST TOMOSYNTHESIS BI: CPT | Mod: BILATERAL PROCEDURE | Performed by: RADIOLOGY

## 2024-06-17 PROCEDURE — 77067 SCR MAMMO BI INCL CAD: CPT | Mod: BILATERAL PROCEDURE | Performed by: RADIOLOGY

## 2024-06-17 PROCEDURE — 77067 SCR MAMMO BI INCL CAD: CPT

## 2024-06-26 DIAGNOSIS — E78.5 DYSLIPIDEMIA: ICD-10-CM

## 2024-06-26 DIAGNOSIS — F41.1 GENERALIZED ANXIETY DISORDER: ICD-10-CM

## 2024-06-26 RX ORDER — FLUOXETINE HYDROCHLORIDE 40 MG/1
40 CAPSULE ORAL DAILY
Qty: 90 CAPSULE | Refills: 0 | Status: SHIPPED | OUTPATIENT
Start: 2024-06-26

## 2024-06-26 RX ORDER — ROSUVASTATIN CALCIUM 20 MG/1
20 TABLET, COATED ORAL DAILY
Qty: 90 TABLET | Refills: 0 | Status: CANCELLED | OUTPATIENT
Start: 2024-06-26

## 2024-06-26 RX ORDER — ROSUVASTATIN CALCIUM 5 MG/1
5 TABLET, COATED ORAL DAILY
Qty: 100 TABLET | Refills: 3 | Status: SHIPPED | OUTPATIENT
Start: 2024-06-26 | End: 2025-07-31

## 2024-07-09 DIAGNOSIS — R63.5 WEIGHT GAIN: ICD-10-CM

## 2024-07-09 NOTE — TELEPHONE ENCOUNTER
Rx Refill Request Telephone Encounter    Name:  Bri Mays  :  [id number]  Medication Name:  tirzepatide, weight loss, (Zepbound) 5 mg/0.5 mL injection       DR. RUTHERFORD: DUE TO INSURANCE, PATIENT REQUESTING 7.5MG OF THIS MEDICATION      Specific Pharmacy location:  [facility]   Date of last appointment:  3/28/2024  Date of next appointment:  none  Best number to reach patient:  [phone]

## 2024-08-05 DIAGNOSIS — R63.5 WEIGHT GAIN: ICD-10-CM

## 2024-08-05 NOTE — TELEPHONE ENCOUNTER
MEDICATION PENDED    Rx Refill Request Telephone Encounter    Name:  Bri Mays  : 1965     Medication Name:  zepbound  Dose (Optional):    7.5  Quantity (Optional):    4  Directions (Optional):   Inject 7.5 mg under the skin every 7 days.     ALLERGIES:   nka    Specific Pharmacy location:  [facility]/pharmacy #[id number] [address] AT [place] [id number]     Date of last appointment:  3/28/24      Best number to reach patient:  [phone]    Want to know if she can get it increased to 10 mg with 2 refills

## 2024-08-06 DIAGNOSIS — R63.5 WEIGHT GAIN: ICD-10-CM

## 2024-08-06 NOTE — TELEPHONE ENCOUNTER
This does not make sense.  If she is doing well on 7.5 there is no reason to increase it if she wants to try increasing to 10 she can.  Insurance sometimes limits the starting dose but not a maintenance dose                       8/6/24 12:11 PM  Joellen Kramer MA attempted to contact Bri Mays (Left Message)   Joellen Kramer MA         8/6/24 12:12 PM  Note     LMOM for patient to call back.    Patient called back and states she's going to have you try and send over the 7.5 mg dose, if it gets denied she will call back and have you call in the 10 mg  Please send the 7.5 mg to [facility]

## 2024-08-06 NOTE — TELEPHONE ENCOUNTER
Winston Tello MD  Do [id number] [facility] Clinical Support Staff1 hour ago (11:06 AM)       This does not make sense.  If she is doing well on 7.5 there is no reason to increase it if she wants to try increasing to 10 she can.  Insurance sometimes limits the starting dose but not a maintenance dose

## 2024-08-06 NOTE — TELEPHONE ENCOUNTER
My insurance will only refill the doses with .5 one time a year. I just had 7.5 and they will not fill it a second time. I am requesting the next dosage of 10 MG. Can I get 2 refills on the dosage, please.  Thank you,     Apryl Mays

## 2024-08-13 DIAGNOSIS — R63.5 WEIGHT GAIN: ICD-10-CM

## 2024-08-13 NOTE — TELEPHONE ENCOUNTER
Rx Refill Request Telephone Encounter    Name:  Bri Mays  : 1965     Medication Name:  ZEPBOUND  Dose (Optional):    10 MGG  Quantity (Optional):    4  Directions (Optional):   Inject 7.5 mg under the skin every 7 days.     ALLERGIES:   NKA    Specific Pharmacy location:  [facility]/pharmacy #[id number] - [facility] [place] - [address] AT [facility]     Date of last appointment:  3/28/24  Date of next appointment:  24    Best number to reach patient:  [phone]

## 2024-09-19 ENCOUNTER — APPOINTMENT (OUTPATIENT)
Dept: PRIMARY CARE | Facility: CLINIC | Age: 59
End: 2024-09-19
Payer: COMMERCIAL

## 2024-09-20 ENCOUNTER — APPOINTMENT (OUTPATIENT)
Dept: PRIMARY CARE | Facility: CLINIC | Age: 59
End: 2024-09-20
Payer: COMMERCIAL

## 2024-09-26 ENCOUNTER — APPOINTMENT (OUTPATIENT)
Dept: PRIMARY CARE | Facility: CLINIC | Age: 59
End: 2024-09-26
Payer: COMMERCIAL

## 2024-09-27 ENCOUNTER — OFFICE VISIT (OUTPATIENT)
Dept: PRIMARY CARE | Facility: CLINIC | Age: 59
End: 2024-09-27
Payer: COMMERCIAL

## 2024-09-27 VITALS
HEIGHT: 63 IN | RESPIRATION RATE: 18 BRPM | BODY MASS INDEX: 33.49 KG/M2 | SYSTOLIC BLOOD PRESSURE: 110 MMHG | DIASTOLIC BLOOD PRESSURE: 76 MMHG | WEIGHT: 189 LBS

## 2024-09-27 DIAGNOSIS — E78.2 MIXED HYPERLIPIDEMIA: Primary | ICD-10-CM

## 2024-09-27 DIAGNOSIS — F41.1 GENERALIZED ANXIETY DISORDER: ICD-10-CM

## 2024-09-27 DIAGNOSIS — Z23 NEED FOR INFLUENZA VACCINATION: ICD-10-CM

## 2024-09-27 PROCEDURE — 90656 IIV3 VACC NO PRSV 0.5 ML IM: CPT | Performed by: FAMILY MEDICINE

## 2024-09-27 PROCEDURE — 1036F TOBACCO NON-USER: CPT | Performed by: FAMILY MEDICINE

## 2024-09-27 PROCEDURE — 3078F DIAST BP <80 MM HG: CPT | Performed by: FAMILY MEDICINE

## 2024-09-27 PROCEDURE — 90471 IMMUNIZATION ADMIN: CPT | Performed by: FAMILY MEDICINE

## 2024-09-27 PROCEDURE — 3074F SYST BP LT 130 MM HG: CPT | Performed by: FAMILY MEDICINE

## 2024-09-27 PROCEDURE — 3008F BODY MASS INDEX DOCD: CPT | Performed by: FAMILY MEDICINE

## 2024-09-27 PROCEDURE — 99213 OFFICE O/P EST LOW 20 MIN: CPT | Performed by: FAMILY MEDICINE

## 2024-09-27 RX ORDER — ROSUVASTATIN CALCIUM 20 MG/1
20 TABLET, COATED ORAL DAILY
Qty: 30 TABLET | Refills: 5 | Status: SHIPPED | OUTPATIENT
Start: 2024-09-27 | End: 2024-10-27

## 2024-09-27 RX ORDER — ALPRAZOLAM 0.5 MG/1
0.5 TABLET ORAL NIGHTLY PRN
Qty: 30 TABLET | Refills: 2 | Status: SHIPPED | OUTPATIENT
Start: 2024-09-27

## 2024-09-27 RX ORDER — FLUOXETINE HYDROCHLORIDE 40 MG/1
40 CAPSULE ORAL DAILY
Qty: 90 CAPSULE | Refills: 1 | Status: SHIPPED | OUTPATIENT
Start: 2024-09-27

## 2024-09-27 NOTE — PROGRESS NOTES
Subjective   Patient ID: Bri Mays is a 59 y.o. female who presents for Hyperlipidemia, Weight Management, and Anxiety.  HPI    Anxiety follow up  Taking the xanax  Working well   90 % effective   Denies any side effects   OAR[place] reviewed today   CSA 3/28/24  Last took this morning     Patient presents in office today for weight management. Patient is currently taking zepbound 10 mg. Patient states that this is working well for her. Has all together lost about 25 lbs. Today's in office weight 189 lbs. Last in office weight 201 lbs. Patient states that her insurance will only cover one 0.5 Rx of weight loss injections a year. Patient admits that she is currently doing well on the 10 mg.     Would like to talk about her rosuvastatin prescription. Patient admits that she is supposed to be on 20 mg. Last few times has been getting 5 mg.     Review of Systems  Constitutional:  no chills, no fever and no night sweats.  Eyes: no blurred vision and no eyesight problems.  ENT: no hearing loss, no nasal congestion, no hoarseness and no sore throat.  Neck: no mass (es) and no swelling.  Cardiovascular: no chest pain, no intermittent leg claudication, no lower extremity edema, no palpitation and no syncope.  Respiratory: no cough, no shortness of breath during exertion, no shortness of breath at rest and no wheezing.  Gastrointestinal: no abdominal pain, no blood in stools, no constipation, no diarrhea, no melena, no nausea, no rectal pain and no vomiting.  Genitourinary: no dysuria, no change in urinary frequency, no urinary hesitancy and no feelings of urinary urgency.  Musculoskeletal: no arthralgias, no back pain and no myalgias.  Integumentary: no new skin lesions and no rashes.  Neurological: no difficulty walking, no headache, no limb weakness, no numbness and no tingling.  Psychiatric/Behavioral: no anxiety, no depression, no anhedonia and no substance use disorders.  Endocrine: no recent weight gain and no  "recent weight loss.  Hematologic/Lymphatic: no tendency for easy bruising and no swollen glands    Objective   Physical Exam  Patient in for follow-up hyperlipidemia anxiety weight management she is down almost 20 pounds total on the Zepbound 10 tolerating it well.  Went to get her prescription refilled she was on Crestor 20 and was told insurance would only cover 5 mg dose which makes no sense that she did not tell us last time and then forgotten her  picked it up again so she just recently got another 20 another 5 mg 30 tablets advised her to use for the time we will send in a new prescription of 20 and we will check with insurance to find out what the problem is.  Lungs are clear cardiac and abdominal exams benign.  /76   Resp 18   Ht 1.6 m (5' 3\")   Wt 85.7 kg (189 lb)   BMI 33.48 kg/m²     Lab Results   Component Value Date    WBC 4.8 12/08/2023    HGB 13.6 12/08/2023    HCT 41.3 12/08/2023    MCV 98 12/08/2023     12/08/2023       Assessment/Plan plan continue current treatment course we will follow-up with her after we find out what is going on with the insurance from the Crestor dose being denied  Problem List Items Addressed This Visit          Mental Health    Generalized anxiety disorder    Relevant Medications    ALPRAZolam (Xanax) 0.5 mg tablet    FLUoxetine (PROzac) 40 mg capsule     Other Visit Diagnoses       Mixed hyperlipidemia    -  Primary    Relevant Medications    rosuvastatin (Crestor) 20 mg tablet    Need for influenza vaccination        Relevant Orders    Flu vaccine, trivalent, preservative free, age 6 months and greater (Fluraix/Fluzone/Flulaval) (Completed)          "

## 2024-10-02 DIAGNOSIS — F41.1 GENERALIZED ANXIETY DISORDER: ICD-10-CM

## 2024-10-02 RX ORDER — ALPRAZOLAM 0.5 MG/1
0.5 TABLET ORAL NIGHTLY PRN
Qty: 30 TABLET | Refills: 2 | Status: SHIPPED | OUTPATIENT
Start: 2024-10-02

## 2024-10-02 NOTE — TELEPHONE ENCOUNTER
Last seen 9/27/24  Medication pended  [employer] CONTRACT IS NEEDED- [employer] MESSAGE SENT TO PATIENT TO GET THIS SCHEDULED

## 2024-10-17 DIAGNOSIS — R63.5 WEIGHT GAIN: ICD-10-CM

## 2024-10-17 NOTE — TELEPHONE ENCOUNTER
MEDICATION PENDED  Patient is calling to request a RX refill with a couple of refills     Rx Refill Request Telephone Encounter    Name:  Bri Mays  : 1965     Medication Name:  Zepbound    2 refills   Dose (Optional):    10 mg/0.5 ml injection  Quantity (Optional):    4 each  Directions (Optional):   Inject 10 mg under the skin every 7 days     ALLERGIES:   nkda     Specific Pharmacy location:  [facility]     Date of last appointment:  24  Date of next appointment:  none     Best number to reach patient:  [phone]

## 2024-11-29 DIAGNOSIS — R63.5 WEIGHT GAIN: ICD-10-CM

## 2024-11-29 NOTE — TELEPHONE ENCOUNTER
Rx Refill Request Telephone Encounter  PT is requesting to go up a dose  Name:  Bri Mays  :  [id number]    tirzepatide, weight loss, (Zepbound) 10 mg/0.5 mL injection [[id number]]    Order Details  Dose: 10 mg Route: subcutaneous Frequency: Every 7 days   Dispense Quantity: 4 each Refills: 1          Sig: Inject 10 mg under the skin every 7 days.         Start Date: 10/17/24 End Date: --   Written Date: 10/17/24 Rx Expiration Date: 10/17/25          Specific Pharmacy location:    [facility]/pharmacy #[id number] [address] AT [place] [id number]  Phone: [phone]  Fax: [phone]  DELANO #: [id number]     Date of last appointment:  24  Date of next appointment:  na  Best number to reach patient:  [phone]

## 2024-12-09 DIAGNOSIS — R63.5 WEIGHT GAIN: ICD-10-CM

## 2024-12-09 NOTE — TELEPHONE ENCOUNTER
Rx Refill Request Telephone Encounter    Name:  Bri Mays  :  [id number]    tirzepatide, weight loss, (Zepbound) 10 mg/0.5 mL injection [[id number]]    Order Details    Dose: 10 mg Route: subcutaneous Frequency: Every 7 days   Dispense Quantity: 4 each Refills: 1          Sig: Inject 12.5 mg under the skin every 7 days.           Specific Pharmacy location:    [facility]/pharmacy #[id number] [address] AT [place] [id number]  Phone: [phone]  Fax: [phone]  DELANO #: [id number]     Date of last appointment:  24  Date of next appointment:  na  Best number to reach patient:  [phone]

## 2025-01-09 ENCOUNTER — APPOINTMENT (OUTPATIENT)
Dept: PRIMARY CARE | Facility: CLINIC | Age: 60
End: 2025-01-09
Payer: COMMERCIAL

## 2025-01-09 VITALS
SYSTOLIC BLOOD PRESSURE: 122 MMHG | BODY MASS INDEX: 32.6 KG/M2 | OXYGEN SATURATION: 97 % | HEART RATE: 83 BPM | HEIGHT: 63 IN | DIASTOLIC BLOOD PRESSURE: 81 MMHG | WEIGHT: 184 LBS | TEMPERATURE: 98.3 F

## 2025-01-09 DIAGNOSIS — G43.909 MIGRAINE WITHOUT STATUS MIGRAINOSUS, NOT INTRACTABLE, UNSPECIFIED MIGRAINE TYPE: ICD-10-CM

## 2025-01-09 DIAGNOSIS — I10 HYPERTENSION, UNSPECIFIED TYPE: ICD-10-CM

## 2025-01-09 DIAGNOSIS — E78.2 MIXED HYPERLIPIDEMIA: ICD-10-CM

## 2025-01-09 DIAGNOSIS — R63.5 WEIGHT GAIN: ICD-10-CM

## 2025-01-09 DIAGNOSIS — Z51.81 THERAPEUTIC DRUG MONITORING: ICD-10-CM

## 2025-01-09 DIAGNOSIS — E03.9 HYPOTHYROIDISM, UNSPECIFIED TYPE: ICD-10-CM

## 2025-01-09 PROCEDURE — 99213 OFFICE O/P EST LOW 20 MIN: CPT | Performed by: FAMILY MEDICINE

## 2025-01-09 PROCEDURE — 3074F SYST BP LT 130 MM HG: CPT | Performed by: FAMILY MEDICINE

## 2025-01-09 PROCEDURE — 1036F TOBACCO NON-USER: CPT | Performed by: FAMILY MEDICINE

## 2025-01-09 PROCEDURE — 3008F BODY MASS INDEX DOCD: CPT | Performed by: FAMILY MEDICINE

## 2025-01-09 PROCEDURE — 3079F DIAST BP 80-89 MM HG: CPT | Performed by: FAMILY MEDICINE

## 2025-01-09 RX ORDER — SUMATRIPTAN SUCCINATE 100 MG/1
100 TABLET ORAL ONCE AS NEEDED
Qty: 9 TABLET | Refills: 2 | Status: SHIPPED | OUTPATIENT
Start: 2025-01-09 | End: 2025-01-09 | Stop reason: SDUPTHER

## 2025-01-09 RX ORDER — SUMATRIPTAN SUCCINATE 100 MG/1
100 TABLET ORAL ONCE AS NEEDED
Qty: 9 TABLET | Refills: 2 | Status: SHIPPED | OUTPATIENT
Start: 2025-01-09

## 2025-01-09 ASSESSMENT — PATIENT HEALTH QUESTIONNAIRE - PHQ9
1. LITTLE INTEREST OR PLEASURE IN DOING THINGS: NOT AT ALL
SUM OF ALL RESPONSES TO PHQ9 QUESTIONS 1 AND 2: 0
2. FEELING DOWN, DEPRESSED OR HOPELESS: NOT AT ALL

## 2025-01-09 NOTE — PROGRESS NOTES
Subjective   Patient ID: Bri Mays is a 59 y.o. female who presents for No chief complaint on file..  HPI  Weight loss management Follow up   Taking Zepbound 10 mg/0.5 ML  Following up for month # 4  Has been eating a generally healthy diet  Exercises 3-4 x per week  What type of exercise walking, jose martin yoga  Patient last in office weight 189 lbs  Today's in office weight 184 lbs   Patient is - 5 lbs   Has previously taken nothing   Co morbidities include- Hypertension and Hyperlipidemia  Any side effects noted? no    Anxiety follow up  Taking the Alprazolam  Working well   100  % effective   Denies any side effects   OARRS reviewed today   CSA 1/9/25  Last took 3 nights ago                 Review of Systems  Constitutional:  no chills, no fever and no night sweats.  Eyes: no blurred vision and no eyesight problems.  ENT: no hearing loss, no nasal congestion, no hoarseness and no sore throat.  Neck: no mass (es) and no swelling.  Cardiovascular: no chest pain, no intermittent leg claudication, no lower extremity edema, no palpitation and no syncope.  Respiratory: no cough, no shortness of breath during exertion, no shortness of breath at rest and no wheezing.  Gastrointestinal: no abdominal pain, no blood in stools, no constipation, no diarrhea, no melena, no nausea, no rectal pain and no vomiting.  Genitourinary: no dysuria, no change in urinary frequency, no urinary hesitancy and no feelings of urinary urgency.  Musculoskeletal: no arthralgias, no back pain and no myalgias.  Integumentary: no new skin lesions and no rashes.  Neurological: no difficulty walking, no headache, no limb weakness, no numbness and no tingling.  Psychiatric/Behavioral: no anxiety, no depression, no anhedonia and no substance use disorders.  Endocrine: no recent weight gain and no recent weight loss.  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  Down 5 pounds would like a refill on the Zepbound.  Physical exam today's office visit constitutional alert and oriented x3.    Head is atraumatic HEENT is within normal limits.    Neck supple no masses full range of motion.    Thyroid is normal in size no thyromegaly there is no carotid bruits.    Pulmonary exam shows clear to auscultation no respiratory distress.    Cardiovascular shows no murmur rub or gallop.  Regular rate and rhythm.    Abdominal exam soft nontender no hepatosplenomegaly or masses normal bowel sounds no rebound no guarding.    Musculoskeletal exam no joint pain no muscle pain full range of motion.    Psych exam normal mood and affect.    Dermatologic exam no skin lesions no rash no blemishes.    Neuro exam is no focal deficits.  Normal exam.    Extremities no edema normal pulses normal capillary refill.    There were no vitals taken for this visit.    Lab Results   Component Value Date    WBC 4.8 12/08/2023    HGB 13.6 12/08/2023    HCT 41.3 12/08/2023    MCV 98 12/08/2023     12/08/2023       Assessment/Plan plan continue current treatment and follow-up 3 months or as needed  Patient will benefit from Phentermine Therapy, given the following:    Advised to take Zepbound 10 mg /0.5 ML once every 7 days       Does  the patient demonstrate dedication to weight loss treatment plan? yes    Benefits, risks, possible adverse effects to the medication discussed today            Problem List Items Addressed This Visit    None

## 2025-01-10 PROCEDURE — 80307 DRUG TEST PRSMV CHEM ANLYZR: CPT

## 2025-01-10 PROCEDURE — 82570 ASSAY OF URINE CREATININE: CPT

## 2025-01-10 RX ORDER — TIRZEPATIDE 10 MG/.5ML
10 INJECTION, SOLUTION SUBCUTANEOUS
Qty: 2 ML | Refills: 1 | Status: SHIPPED | OUTPATIENT
Start: 2025-01-10

## 2025-01-11 LAB
AMPHETAMINES UR QL SCN: NORMAL
BARBITURATES UR QL SCN: NORMAL
BZE UR QL SCN: NORMAL
CANNABINOIDS UR QL SCN: NORMAL
CREAT UR-MCNC: 52.9 MG/DL (ref 20–320)
PCP UR QL SCN: NORMAL

## 2025-01-13 DIAGNOSIS — G43.909 MIGRAINE WITHOUT STATUS MIGRAINOSUS, NOT INTRACTABLE, UNSPECIFIED MIGRAINE TYPE: ICD-10-CM

## 2025-01-13 RX ORDER — SUMATRIPTAN SUCCINATE 100 MG/1
TABLET ORAL
Qty: 9 TABLET | Refills: 2 | Status: SHIPPED | OUTPATIENT
Start: 2025-01-13

## 2025-01-17 ENCOUNTER — TELEPHONE (OUTPATIENT)
Dept: PRIMARY CARE | Facility: CLINIC | Age: 60
End: 2025-01-17
Payer: COMMERCIAL

## 2025-01-17 NOTE — TELEPHONE ENCOUNTER
Please let Bri Mays know Mounjaro is only covered for diabetic patients.  Wegovy would be her other option, which is slightly different than Zepbound.  She should call her insurance to see if it is covered.  If it is not covered, she can get generic Wegovy (semaglutide) from [employer] for around $200/month outside her insurance.

## 2025-01-17 NOTE — TELEPHONE ENCOUNTER
Salma Aldridge, APRN-CNP, DNP1 hour ago (11:36 AM)     SP  Please let Bri Mays know Mounjaro is only covered for diabetic patients.  Wegovy would be her other option, which is slightly different than Zepbound.  She should call her insurance to see if it is covered.  If it is not covered, she can get generic Wegovy (semaglutide) from [employer] for around $200/month outside her insurance.      LVM for patient to call office

## 2025-01-17 NOTE — TELEPHONE ENCOUNTER
Patient calling since zepbound is not covered under patient insurance, she wants to know if she can get Mounjaro instead, please advise?

## 2025-07-02 LAB
ALBUMIN SERPL-MCNC: 4.3 G/DL (ref 3.6–5.1)
ALP SERPL-CCNC: 48 U/L (ref 37–153)
ALT SERPL-CCNC: 20 U/L (ref 6–29)
ANION GAP SERPL CALCULATED.4IONS-SCNC: 6 MMOL/L (CALC) (ref 7–17)
AST SERPL-CCNC: 21 U/L (ref 10–35)
BASOPHILS # BLD AUTO: 19 CELLS/UL (ref 0–200)
BASOPHILS NFR BLD AUTO: 0.5 %
BILIRUB SERPL-MCNC: 0.7 MG/DL (ref 0.2–1.2)
BUN SERPL-MCNC: 16 MG/DL (ref 7–25)
CALCIUM SERPL-MCNC: 9.3 MG/DL (ref 8.6–10.4)
CHLORIDE SERPL-SCNC: 105 MMOL/L (ref 98–110)
CHOLEST SERPL-MCNC: 182 MG/DL
CHOLEST/HDLC SERPL: 2.4 (CALC)
CO2 SERPL-SCNC: 28 MMOL/L (ref 20–32)
CREAT SERPL-MCNC: 0.59 MG/DL (ref 0.5–1.03)
EGFRCR SERPLBLD CKD-EPI 2021: 104 ML/MIN/1.73M2
EOSINOPHIL # BLD AUTO: 200 CELLS/UL (ref 15–500)
EOSINOPHIL NFR BLD AUTO: 5.4 %
ERYTHROCYTE [DISTWIDTH] IN BLOOD BY AUTOMATED COUNT: 13.5 % (ref 11–15)
GLUCOSE SERPL-MCNC: 106 MG/DL (ref 65–99)
HCT VFR BLD AUTO: 40.2 % (ref 35–45)
HDLC SERPL-MCNC: 77 MG/DL
HGB BLD-MCNC: 13.3 G/DL (ref 11.7–15.5)
LDLC SERPL CALC-MCNC: 81 MG/DL (CALC)
LYMPHOCYTES # BLD AUTO: 1650 CELLS/UL (ref 850–3900)
LYMPHOCYTES NFR BLD AUTO: 44.6 %
MCH RBC QN AUTO: 33 PG (ref 27–33)
MCHC RBC AUTO-ENTMCNC: 33.1 G/DL (ref 32–36)
MCV RBC AUTO: 99.8 FL (ref 80–100)
MONOCYTES # BLD AUTO: 307 CELLS/UL (ref 200–950)
MONOCYTES NFR BLD AUTO: 8.3 %
NEUTROPHILS # BLD AUTO: 1524 CELLS/UL (ref 1500–7800)
NEUTROPHILS NFR BLD AUTO: 41.2 %
NONHDLC SERPL-MCNC: 105 MG/DL (CALC)
PLATELET # BLD AUTO: 264 THOUSAND/UL (ref 140–400)
PMV BLD REES-ECKER: 8.9 FL (ref 7.5–12.5)
POTASSIUM SERPL-SCNC: 4.4 MMOL/L (ref 3.5–5.3)
PROT SERPL-MCNC: 6.4 G/DL (ref 6.1–8.1)
RBC # BLD AUTO: 4.03 MILLION/UL (ref 3.8–5.1)
SODIUM SERPL-SCNC: 139 MMOL/L (ref 135–146)
T4 FREE SERPL-MCNC: 1.4 NG/DL (ref 0.8–1.8)
TRIGL SERPL-MCNC: 142 MG/DL
TSH SERPL-ACNC: 3.19 MIU/L (ref 0.4–4.5)
WBC # BLD AUTO: 3.7 THOUSAND/UL (ref 3.8–10.8)

## 2025-07-03 ENCOUNTER — OFFICE VISIT (OUTPATIENT)
Dept: PRIMARY CARE | Facility: CLINIC | Age: 60
End: 2025-07-03
Payer: COMMERCIAL

## 2025-07-03 VITALS
HEIGHT: 62 IN | DIASTOLIC BLOOD PRESSURE: 78 MMHG | OXYGEN SATURATION: 95 % | SYSTOLIC BLOOD PRESSURE: 128 MMHG | BODY MASS INDEX: 38.09 KG/M2 | HEART RATE: 76 BPM | WEIGHT: 207 LBS

## 2025-07-03 DIAGNOSIS — M17.12 OSTEOARTHRITIS OF LEFT KNEE, UNSPECIFIED OSTEOARTHRITIS TYPE: ICD-10-CM

## 2025-07-03 DIAGNOSIS — E03.9 HYPOTHYROIDISM, UNSPECIFIED TYPE: ICD-10-CM

## 2025-07-03 DIAGNOSIS — G43.909 MIGRAINE WITHOUT STATUS MIGRAINOSUS, NOT INTRACTABLE, UNSPECIFIED MIGRAINE TYPE: ICD-10-CM

## 2025-07-03 DIAGNOSIS — E78.2 MIXED HYPERLIPIDEMIA: ICD-10-CM

## 2025-07-03 DIAGNOSIS — I10 HYPERTENSION, UNSPECIFIED TYPE: ICD-10-CM

## 2025-07-03 DIAGNOSIS — R63.5 WEIGHT GAIN: Primary | ICD-10-CM

## 2025-07-03 DIAGNOSIS — F41.1 GENERALIZED ANXIETY DISORDER: ICD-10-CM

## 2025-07-03 RX ORDER — ALPRAZOLAM 0.5 MG/1
0.5 TABLET ORAL NIGHTLY PRN
Qty: 30 TABLET | Refills: 2 | Status: SHIPPED | OUTPATIENT
Start: 2025-07-03

## 2025-07-03 RX ORDER — SUMATRIPTAN SUCCINATE 100 MG/1
100 TABLET ORAL ONCE AS NEEDED
Qty: 9 TABLET | Refills: 2 | Status: SHIPPED | OUTPATIENT
Start: 2025-07-03

## 2025-07-03 RX ORDER — PHENTERMINE HYDROCHLORIDE 37.5 MG/1
37.5 CAPSULE ORAL
Qty: 30 CAPSULE | Refills: 0 | Status: SHIPPED | OUTPATIENT
Start: 2025-07-03 | End: 2025-08-02

## 2025-07-03 RX ORDER — FLUOXETINE HYDROCHLORIDE 40 MG/1
40 CAPSULE ORAL DAILY
Qty: 90 CAPSULE | Refills: 1 | Status: SHIPPED | OUTPATIENT
Start: 2025-07-03

## 2025-07-03 RX ORDER — CELECOXIB 200 MG/1
200 CAPSULE ORAL DAILY
Qty: 90 CAPSULE | Refills: 1 | Status: SHIPPED | OUTPATIENT
Start: 2025-07-03

## 2025-07-03 ASSESSMENT — PATIENT HEALTH QUESTIONNAIRE - PHQ9
SUM OF ALL RESPONSES TO PHQ9 QUESTIONS 1 AND 2: 0
1. LITTLE INTEREST OR PLEASURE IN DOING THINGS: NOT AT ALL
2. FEELING DOWN, DEPRESSED OR HOPELESS: NOT AT ALL

## 2025-07-03 NOTE — PROGRESS NOTES
Subjective   Patient ID: Bri Mays is a 60 y.o. female who presents for No chief complaint on file..  HPI    Patient presents today for a physical. Pt does not need form filled out. Pt was fasting for blood work. Tries to eat a generally healthy diet. Exercises infrequently.     Patient presents in office today for weight management. Patient is not taking Zepbound anymore due to receiving a new insurance. Patient would harmony to discuss the adipex . Pt states he is not open to semaglutide due to cost.     Review of Systems  Constitutional:  no chills, no fever and no night sweats.  Eyes: no blurred vision and no eyesight problems.  ENT: no hearing loss, no nasal congestion, no hoarseness and no sore throat.  Neck: no mass (es) and no swelling.  Cardiovascular: no chest pain, no intermittent leg claudication, no lower extremity edema, no palpitation and no syncope.  Respiratory: no cough, no shortness of breath during exertion, no shortness of breath at rest and no wheezing.  Gastrointestinal: no abdominal pain, no blood in stools, no constipation, no diarrhea, no melena, no nausea, no rectal pain and no vomiting.  Genitourinary: no dysuria, no change in urinary frequency, no urinary hesitancy and no feelings of urinary urgency.  Musculoskeletal: no arthralgias, no back pain and no myalgias.  Integumentary: no new skin lesions and no rashes.  Neurological: no difficulty walking, no headache, no limb weakness, no numbness and no tingling.  Psychiatric/Behavioral: no anxiety, no depression, no anhedonia and no substance use disorders.  Endocrine: no recent weight gain and no recent weight loss.  Hematologic/Lymphatic: no tendency for easy bruising and no swollen glands    Objective   Physical Exam  Patient in for physical exam was on Zepbound changed jobs and insurance no longer covers it she would like to try Adipex.  Well-developed well-nourished woman in no acute distress denies chest pain or shortness of  breath with exertion she has a history of obesity had lost some weight and is about gained back.  We had another discussion about how she needs to look at lifestyle modify her diet not just go on it diet for short period of time.  She is status post bilateral knee replacements she and her  just got some bikes with electric assist so that she can ride if she gets starts to make it back home.  Will try to do more physical activity she would like to try Adipex.  Physical exam today's office visit constitutional alert and oriented x3.    Head is atraumatic HEENT is within normal limits.    Neck supple no masses full range of motion.    Thyroid is normal in size no thyromegaly there is no carotid bruits.    Pulmonary exam shows clear to auscultation no respiratory distress.    Cardiovascular shows no murmur rub or gallop.  Regular rate and rhythm.    Abdominal exam soft nontender no hepatosplenomegaly or masses normal bowel sounds no rebound no guarding.    Musculoskeletal exam no joint pain no muscle pain full range of motion.    Psych exam normal mood and affect.    Dermatologic exam no skin lesions no rash no blemishes.    Neuro exam is no focal deficits.  Normal exam.    Extremities no edema normal pulses normal capillary refill.    There were no vitals taken for this visit.    Lab Results   Component Value Date    WBC 3.7 (L) 07/01/2025    HGB 13.3 07/01/2025    HCT 40.2 07/01/2025    MCV 99.8 07/01/2025     07/01/2025       Assessment/Plan plan is Adipex follow-up in a month to check blood pressure and heart rate.  Make sure she is sleeping okay.  If she has any problems in the meantime she is to stop the medication and call us  Problem List Items Addressed This Visit    None

## 2025-07-09 DIAGNOSIS — R63.5 WEIGHT GAIN: ICD-10-CM

## 2025-07-09 RX ORDER — PHENTERMINE HYDROCHLORIDE 37.5 MG/1
37.5 CAPSULE ORAL
Qty: 30 CAPSULE | Refills: 0 | Status: SHIPPED | OUTPATIENT
Start: 2025-07-09 | End: 2025-08-08

## 2025-07-09 NOTE — TELEPHONE ENCOUNTER
Rx Controlled Refill Request Telephone Encounter    [facility] NEVER FILLED THIS PRESCRIPTION THEY ARE NO LONGER ABLE TO FILL THESE OR ORDER THIS MEDICATION    Name: Bri Mays  :  1965    phentermine 37.5 mg capsule [[id number]]    Order Details    Dose: 37.5 mg Route: oral Frequency: Daily before breakfast   Dispense Quantity: 30 capsule (30 day supply) Refills: 0    Duration: 30 days Dispense As Written: No          Sig: Take 1 capsule (37.5 mg) by mouth once daily in the morning. Take before meals.           ALLERGIES:    NONE    LAST DRUG SCREEN/MED CONTRACT:         Specific Pharmacy location:    [id number] - [place], [place] [address]  [[id number]]     Date of last appointment:    7/3/25  Date of next appointment:        Best number to reach patient:    [phone]

## 2025-08-11 ENCOUNTER — CLINICAL SUPPORT (OUTPATIENT)
Dept: PRIMARY CARE | Facility: CLINIC | Age: 60
End: 2025-08-11
Payer: COMMERCIAL

## 2025-08-11 DIAGNOSIS — Z01.30 BP CHECK: Primary | ICD-10-CM

## 2025-08-11 DIAGNOSIS — R63.5 WEIGHT GAIN: ICD-10-CM

## 2025-08-11 RX ORDER — PHENTERMINE HYDROCHLORIDE 37.5 MG/1
37.5 CAPSULE ORAL
Qty: 30 CAPSULE | Refills: 0 | Status: SHIPPED | OUTPATIENT
Start: 2025-08-11 | End: 2025-09-13

## (undated) DEVICE — HEADLESS TROCHAR PIN 75MM: Brand: ZUK

## (undated) DEVICE — STERILE PATIENT PROTECTIVE PAD FOR IMP® KNEE POSITIONERS & COHESIVE WRAP (10 / CASE): Brand: DE MAYO KNEE POSITIONER®

## (undated) DEVICE — SCREW BONE CORTICAL VIVACIT-E 2.5 X 25MM FEMALE HEX
Type: IMPLANTABLE DEVICE | Site: KNEE | Status: NON-FUNCTIONAL
Removed: 2023-12-26

## (undated) DEVICE — GOWN,AURORA,NONRNF,XL,30/CS: Brand: MEDLINE

## (undated) DEVICE — GUIDEPIN SURG KT FOR MED SURG LPS-FLEX

## (undated) DEVICE — DRESSING HYDROFIBER AQUACEL AG ADVANTAGE 3.5X12 IN

## (undated) DEVICE — SUTURE MCRYL SZ 4-0 L27IN ABSRB UD L19MM PS-2 1/2 CIR PRIM Y426H

## (undated) DEVICE — TOTAL KNEE: Brand: MEDLINE INDUSTRIES, INC.

## (undated) DEVICE — Device: Brand: STABLECUT®

## (undated) DEVICE — DRESSING PETRO W3XL8IN OIL EMUL N ADH GZ KNIT IMPREG CELOS

## (undated) DEVICE — ELECTRODE ES L275IN 275IN BLDE TIP COAT PTFE TEF W EVAC

## (undated) DEVICE — TUBING, SUCTION, 9/32" X 12', STRAIGHT: Brand: MEDLINE INDUSTRIES, INC.

## (undated) DEVICE — 450 ML BOTTLE OF 0.05% CHLORHEXIDINE GLUCONATE IN 99.95% STERILE WATER FOR IRRIGATION, USP AND APPLICATOR.: Brand: IRRISEPT ANTIMICROBIAL WOUND LAVAGE

## (undated) DEVICE — SUTURE ETHLN SZ 4-0 L18IN NONABSORBABLE BLK L19MM PS-2 3/8 1667H

## (undated) DEVICE — GLOVE ORANGE PI 7   MSG9070

## (undated) DEVICE — BLADE RMR L26MM PAT W/ PILOT H

## (undated) DEVICE — APPLICATOR MEDICATED 26 CC SOLUTION HI LT ORNG CHLORAPREP

## (undated) DEVICE — GLOVE ORTHO 8   MSG9480

## (undated) DEVICE — SUTURE VCRL + SZ 1 L27IN ABSRB UD CT-1 L36MM 1/2 CIR TAPR VCPP40D

## (undated) DEVICE — T-DRAPE,EXTREMITY,STERILE: Brand: MEDLINE

## (undated) DEVICE — BANDAGE,GAUZE,BULKEE II,4.5"X4.1YD,STRL: Brand: MEDLINE

## (undated) DEVICE — GUIDEPIN SURG L29MM FEM TIB PREFERRED PT SPEC CR-FLEX DISP

## (undated) DEVICE — WRAP COHESIVE W2INXL5YD TAN SELF ADH BNDG HND NON STERILE TEAR CARING

## (undated) DEVICE — SPLINT KNEE UNIV FOR LESS THAN 36IN L20IN FOAM LAM E CNTCT

## (undated) DEVICE — 4-PORT MANIFOLD: Brand: NEPTUNE 2

## (undated) DEVICE — NEPTUNE E-SEP SMOKE EVACUATION PENCIL, COATED, 70MM BLADE, PUSH BUTTON SWITCH: Brand: NEPTUNE E-SEP

## (undated) DEVICE — INSTINCT ENDOSCOPIC HEMOCLIP: Brand: INSTINCT

## (undated) DEVICE — SUTURE VCRL SZ 2-0 L36IN ABSRB UD L36MM CT-1 1/2 CIR J945H

## (undated) DEVICE — GLOVE ORANGE PI 7 1/2   MSG9075

## (undated) DEVICE — GLOVE ORTHO 7 1/2   MSG9475

## (undated) DEVICE — SCREWDRIVER SURG OD2MM HEX FEM CANN KNEE SET S STL NXGN

## (undated) DEVICE — FAN SPRAY KIT: Brand: PULSAVAC®

## (undated) DEVICE — SPONGE GZ W4XL4IN COT 12 PLY TYP VII WVN C FLD DSGN

## (undated) DEVICE — SUTURE ETHBND EXCEL SZ 2 L30IN NONABSORBABLE GRN L40MM V-37 MX69G

## (undated) DEVICE — HAND II: Brand: MEDLINE INDUSTRIES, INC.

## (undated) DEVICE — BLADE SAW W125XL70MM THK064MM CUT THK094MM REPL RECIP DBL